# Patient Record
Sex: FEMALE | Race: WHITE | NOT HISPANIC OR LATINO | Employment: FULL TIME | URBAN - NONMETROPOLITAN AREA
[De-identification: names, ages, dates, MRNs, and addresses within clinical notes are randomized per-mention and may not be internally consistent; named-entity substitution may affect disease eponyms.]

---

## 2017-02-14 ENCOUNTER — OFFICE VISIT (OUTPATIENT)
Dept: RETAIL CLINIC | Facility: CLINIC | Age: 22
End: 2017-02-14

## 2017-02-14 VITALS
BODY MASS INDEX: 22.49 KG/M2 | RESPIRATION RATE: 16 BRPM | WEIGHT: 135 LBS | OXYGEN SATURATION: 99 % | TEMPERATURE: 98.1 F | HEART RATE: 88 BPM | HEIGHT: 65 IN

## 2017-02-14 DIAGNOSIS — J06.9 UPPER RESPIRATORY TRACT INFECTION, UNSPECIFIED TYPE: Primary | ICD-10-CM

## 2017-02-14 DIAGNOSIS — H66.90 EAR INFECTION: ICD-10-CM

## 2017-02-14 PROCEDURE — 99213 OFFICE O/P EST LOW 20 MIN: CPT | Performed by: NURSE PRACTITIONER

## 2017-02-14 RX ORDER — FLUTICASONE PROPIONATE 50 MCG
SPRAY, SUSPENSION (ML) NASAL
Qty: 1 BOTTLE | Refills: 0 | Status: SHIPPED | OUTPATIENT
Start: 2017-02-14 | End: 2017-07-16

## 2017-02-14 RX ORDER — AMOXICILLIN 400 MG/5ML
POWDER, FOR SUSPENSION ORAL
Qty: 225 ML | Refills: 0 | Status: SHIPPED | OUTPATIENT
Start: 2017-02-14 | End: 2017-07-16

## 2017-02-14 NOTE — PATIENT INSTRUCTIONS
"Upper Respiratory Infection, Adult  Most upper respiratory infections (URIs) are caused by a virus. A URI affects the nose, throat, and upper air passages. The most common type of URI is often called \"the common cold.\"  HOME CARE   · Take medicines only as told by your doctor.  · Gargle warm saltwater or take cough drops to comfort your throat as told by your doctor.  · Use a warm mist humidifier or inhale steam from a shower to increase air moisture. This may make it easier to breathe.  · Drink enough fluid to keep your pee (urine) clear or pale yellow.  · Eat soups and other clear broths.  · Have a healthy diet.  · Rest as needed.  · Go back to work when your fever is gone or your doctor says it is okay.  ¨ You may need to stay home longer to avoid giving your URI to others.  ¨ You can also wear a face mask and wash your hands often to prevent spread of the virus.  · Use your inhaler more if you have asthma.  · Do not use any tobacco products, including cigarettes, chewing tobacco, or electronic cigarettes. If you need help quitting, ask your doctor.  GET HELP IF:  · You are getting worse, not better.  · Your symptoms are not helped by medicine.  · You have chills.  · You are getting more short of breath.  · You have brown or red mucus.  · You have yellow or brown discharge from your nose.  · You have pain in your face, especially when you bend forward.  · You have a fever.  · You have puffy (swollen) neck glands.  · You have pain while swallowing.  · You have white areas in the back of your throat.  GET HELP RIGHT AWAY IF:   · You have very bad or constant:    Headache.    Ear pain.    Pain in your forehead, behind your eyes, and over your cheekbones (sinus pain).    Chest pain.  · You have long-lasting (chronic) lung disease and any of the following:    Wheezing.    Long-lasting cough.    Coughing up blood.    A change in your usual mucus.  · You have a stiff neck.  · You have changes in your:    Vision.   "  Hearing.    Thinking.    Mood.  MAKE SURE YOU:   · Understand these instructions.  · Will watch your condition.  · Will get help right away if you are not doing well or get worse.     This information is not intended to replace advice given to you by your health care provider. Make sure you discuss any questions you have with your health care provider.     Document Released: 06/05/2009 Document Revised: 05/03/2016 Document Reviewed: 03/25/2015  [employer] Patient Education ©2016 [employer]  Otitis Media, Adult  Otitis media is redness, soreness, and puffiness (swelling) in the space just behind your eardrum (middle ear). It may be caused by allergies or infection. It often happens along with a cold.  HOME CARE  · Take your medicine as told. Finish it even if you start to feel better.  · Only take over-the-counter or prescription medicines for pain, discomfort, or fever as told by your doctor.  · Follow up with your doctor as told.  GET HELP IF:  · You have otitis media only in one ear, or bleeding from your nose, or both.  · You notice a lump on your neck.  · You are not getting better in 3-5 days.  · You feel worse instead of better.  GET HELP RIGHT AWAY IF:   · You have pain that is not helped with medicine.  · You have puffiness, redness, or pain around your ear.  · You get a stiff neck.  · You cannot move part of your face (paralysis).  · You notice that the bone behind your ear hurts when you touch it.  MAKE SURE YOU:   · Understand these instructions.  · Will watch your condition.  · Will get help right away if you are not doing well or get worse.     This information is not intended to replace advice given to you by your health care provider. Make sure you discuss any questions you have with your health care provider.     Document Released: 06/05/2009 Document Revised: 01/08/2016 Document Reviewed: 07/15/2014  [employer] Patient Education ©2016 [employer]

## 2017-02-14 NOTE — PROGRESS NOTES
HPI Comments: Dedra presents to the clinic today c/o congestion which started four days ago. Associated symptoms include sore throat, ear pain, and headache. She has tried Ibuprofen  without adequate relief. Refer to ROS for additional information.    URI    The current episode started in the past 7 days. The problem has been gradually worsening. Associated symptoms include congestion, coughing (Nonproductive), ear pain, headaches, nausea (Has improved today), rhinorrhea and a sore throat. Pertinent negatives include no abdominal pain, rash, vomiting or wheezing. She has tried NSAIDs for the symptoms. The treatment provided mild relief.      The following portions of the patient's history were reviewed and updated as appropriate: allergies, current medications, past family history, past medical history, past social history, past surgical history and problem list.    Review of Systems   Constitutional: Positive for appetite change and fatigue. Fever: Tactile.   HENT: Positive for congestion, ear pain, postnasal drip, rhinorrhea and sore throat. Negative for sinus pressure.    Eyes: Negative for discharge and redness.   Respiratory: Positive for cough (Nonproductive). Negative for chest tightness, shortness of breath and wheezing.    Gastrointestinal: Positive for nausea (Has improved today). Negative for abdominal pain and vomiting.   Musculoskeletal: Negative for neck stiffness.   Skin: Negative for rash.   Neurological: Positive for headaches.   Hematological: Negative for adenopathy.   All other systems reviewed and are negative.    Physical Exam   Constitutional: She is oriented to person, place, and time. She appears well-developed and well-nourished. No distress.   HENT:   Head: Normocephalic.   Right Ear: Ear canal normal. There is tenderness. Tympanic membrane is erythematous and bulging. Tympanic membrane is not scarred and not retracted. A middle ear effusion is present.   Left Ear: Ear canal normal. There  is tenderness. Tympanic membrane is bulging. Tympanic membrane is not scarred, not erythematous and not retracted. A middle ear effusion is present.   Nose: Mucosal edema and rhinorrhea present. No epistaxis. Right sinus exhibits no maxillary sinus tenderness and no frontal sinus tenderness. Left sinus exhibits no maxillary sinus tenderness and no frontal sinus tenderness.   Mouth/Throat: Uvula is midline and mucous membranes are normal. No uvula swelling. Posterior oropharyngeal erythema present. No oropharyngeal exudate or posterior oropharyngeal edema.   Eyes: Pupils are equal, round, and reactive to light. Right eye exhibits no discharge. Left eye exhibits no discharge.   Neck: Neck supple. No rigidity.   Cardiovascular: Normal rate, regular rhythm and normal heart sounds.    Pulmonary/Chest: Effort normal. She has no decreased breath sounds. She has no wheezes. She has no rhonchi. She has no rales.   Abdominal: Soft. Bowel sounds are normal. There is no tenderness. There is no rigidity, no rebound, no guarding and no CVA tenderness.   Lymphadenopathy:     She has no cervical adenopathy.   Neurological: She is alert and oriented to person, place, and time.   Skin: Skin is warm and dry.   Psychiatric: She has a normal mood and affect. Her behavior is normal.   Vitals reviewed.    Assessment/Plan   Dedra was seen today for uri.    Diagnoses and all orders for this visit:    Upper respiratory tract infection, unspecified type  Comments:  Findings and recommendations discussed with Dedra. Supportive care measures. She declined a Strep Test today.   Orders:  -     fluticasone (FLONASE) 50 MCG/ACT nasal spray; Administer 1 spray both nostrils twice  daily    Ear infection  Comments:  Supportive care measures.  Orders:  -     amoxicillin (AMOXIL) 400 MG/5ML suspension; Take 11 ml by mouth twice a day for ten days      Findings and recommendations discussed with Dedra. Supportive care measures. Counseled regarding  impact of antibiotic on birth control.  Encouraged Dedra to seek further medical evaluation if symptoms worsen or do not improve within 48-72 hours.

## 2017-06-20 ENCOUNTER — OFFICE VISIT (OUTPATIENT)
Dept: FAMILY MEDICINE CLINIC | Facility: CLINIC | Age: 22
End: 2017-06-20

## 2017-06-20 VITALS
DIASTOLIC BLOOD PRESSURE: 92 MMHG | SYSTOLIC BLOOD PRESSURE: 129 MMHG | HEIGHT: 65 IN | HEART RATE: 82 BPM | OXYGEN SATURATION: 97 % | WEIGHT: 140.4 LBS | BODY MASS INDEX: 23.39 KG/M2

## 2017-06-20 DIAGNOSIS — J30.89 SEASONAL ALLERGIC RHINITIS DUE TO OTHER ALLERGIC TRIGGER: ICD-10-CM

## 2017-06-20 DIAGNOSIS — G43.509 PERSISTENT MIGRAINE AURA WITHOUT CEREBRAL INFARCTION AND WITHOUT STATUS MIGRAINOSUS, NOT INTRACTABLE: Primary | ICD-10-CM

## 2017-06-20 PROCEDURE — 99214 OFFICE O/P EST MOD 30 MIN: CPT | Performed by: FAMILY MEDICINE

## 2017-06-20 RX ORDER — SUMATRIPTAN 20 MG/1
1 SPRAY NASAL
Qty: 1 EACH | Refills: 1 | Status: SHIPPED | OUTPATIENT
Start: 2017-06-20 | End: 2019-06-25 | Stop reason: SDUPTHER

## 2017-06-20 RX ORDER — CETIRIZINE HYDROCHLORIDE, PSEUDOEPHEDRINE HYDROCHLORIDE 5; 120 MG/1; MG/1
1 TABLET, FILM COATED, EXTENDED RELEASE ORAL DAILY
Qty: 30 TABLET | Refills: 2 | Status: SHIPPED | OUTPATIENT
Start: 2017-06-20 | End: 2018-11-27

## 2017-06-20 RX ORDER — SUMATRIPTAN 100 MG/1
100 TABLET, FILM COATED ORAL ONCE AS NEEDED
Qty: 10 TABLET | Refills: 0 | Status: SHIPPED | OUTPATIENT
Start: 2017-06-20 | End: 2017-12-06

## 2017-07-17 NOTE — PROGRESS NOTES
"Dedra Diallo     VITALS: Blood pressure 129/92, pulse 82, height 65\" (165.1 cm), weight 140 lb 6.4 oz (63.7 kg), SpO2 97 %, not currently breastfeeding.    Subjective  Chief Complaint:   Chief Complaint   Patient presents with   • Headache        History of Present Illness:  Patient is a 22 y.o.  female who presents to clinic secondary to an acute concern.    Patient was seen today for these conditions and concerns:  Patient has been having more and more migraines than usual. She does have a history of migraines and usually takes NSAIDS. However, over the last week, she has been having daily headaches. She states that she has been taking NSAIDs and Goody powders several times a day. When the medication runs out, her headaches recur. She tried to take her sister's imitrex and that helped her the most. She knows when the migraines are coming on - she states that her vision greys out and she has occasionally numbness in her hands. The migraines happen on either side of her head and then goes off over. Occasional nausea and vomiting. No changes in vision. No changes in speech. This is not the worst headache in the world. States that more recently, she has been having sinus congestion and bilateral ear fullness. Denies any fevers, chills, rhinorrhea, or coughing. Does have a history of allergies - is currently not on any medications for it.    No complaints about any of the medications.    The following portions of the patient's history were reviewed and updated as appropriate: allergies, current medications, past family history, past medical history, past social history, past surgical history and problem list.    Past Medical History  Past Medical History:   Diagnosis Date   • Anxiety        Review of Systems  Constitutional: Denies any recent history of fevers, chills, itching.  Ears, Nose, Mouth, Throat: Denies any sore throat, rhinorrhea, or cough.  Cardiovascular: Denies any chest pain, pressure, or " palpitations.  Respiratory: Denies any shortness of breath or wheezing.  Gastrointestinal: Denies any abdominal pain, nausea, vomiting, diarrhea, or constipation.  Genitourinary: Denies any changes in urination.  Musculoskeletal: Denies any muscle weakness.  Skin and/or breasts: Denies any rashes.  Neurological: Denies any changes in balance or gait.  Psychiatric: Denies any suicidal or homicidal ideations.  Endocrine: Denies any heat or cold intolerance. Denies any voice changes, polydipsia, or polyuria.  Hematologic/Lymphatic: Denies any anemia or easy bruising.    Surgical History  Past Surgical History:   Procedure Laterality Date   • ADENOIDECTOMY     • TONSILLECTOMY         Family History  Family History   Problem Relation Age of Onset   • No Known Problems Mother    • No Known Problems Father        Social History  Social History     Social History   • Marital status: Single     Spouse name: N/A   • Number of children: N/A   • Years of education: N/A     Occupational History   • Not on file.     Social History Main Topics   • Smoking status: Never Smoker   • Smokeless tobacco: Never Used   • Alcohol use No   • Drug use: No   • Sexual activity: Defer     Other Topics Concern   • Not on file     Social History Narrative       Objective  Physical Exam  Gen: Patient in NAD. Pleasant and answers appropriately. A&Ox3.    Skin: Warm and dry with normal turgor. No purpura, rashes, or unusual pigmentation noted. Hair is normal in appearance and distribution.    HEENT: NC/AT. No lesions noted. Conjunctiva clear, sclera nonicteric. PERRL. EOMI without nystagmus or strabismus. Fundi appear benign. No hemorrhages or exudates of eyes. Auditory canals are patent bilaterally without lesions. TMs intact,  nonerythematous, nonbulging without lesions. Nasal mucosa pink, nonerythematous, and nonedematous. Frontal and maxillary sinuses are nontender. O/P nonerythematous and moist without exudate.    Neck: Supple without lymph  nodes palpated. FROM.     Lungs: CTA B/L without rales, rhonchi, crackles, or wheezes.    Heart: RRR. S1 and S2 normal. No S3 or S4. No MRGT.    Abd: Soft, nontender,nondistended. (+)BSx4 quadrants.     Extrem: No CCE. Radial pulses 2+/4 and equal B/L. FROMx4. No bone, joint, or muscle tenderness noted.    Neuro: No focal motor/sensory deficits.    Procedures    Assessment/Plan  Dedra Diallo is a 22 y.o. here for an acute concern.    Migraines  Most likely now she is having rebound headaches secondary to the NSAIDs and the caffeine. Discussed with patient to stop those medications. Discussed with patient to increase fluids. Will start patient on sumatriptan; script written.    Allergic rhinitis  Patient to start zyrtec-D daily. This may also be playing a role in her migraines.    Findings and plans discussed with patient who verbalizes understanding and agreement. Will followup with patient once results are in. Patient to followup at clinic PRN or in three months for further medical followup.    Rufina Smith MD

## 2017-10-17 RX ORDER — NORETHINDRONE ACETATE AND ETHINYL ESTRADIOL .03; 1.5 MG/1; MG/1
1 TABLET ORAL DAILY
Qty: 28 EACH | Refills: 11 | Status: SHIPPED | OUTPATIENT
Start: 2017-10-17 | End: 2017-11-16 | Stop reason: SDUPTHER

## 2017-11-16 ENCOUNTER — OFFICE VISIT (OUTPATIENT)
Dept: FAMILY MEDICINE CLINIC | Facility: CLINIC | Age: 22
End: 2017-11-16

## 2017-11-16 ENCOUNTER — TELEPHONE (OUTPATIENT)
Dept: FAMILY MEDICINE CLINIC | Facility: CLINIC | Age: 22
End: 2017-11-16

## 2017-11-16 VITALS
BODY MASS INDEX: 24.32 KG/M2 | SYSTOLIC BLOOD PRESSURE: 122 MMHG | DIASTOLIC BLOOD PRESSURE: 86 MMHG | HEART RATE: 89 BPM | WEIGHT: 146 LBS | OXYGEN SATURATION: 99 % | HEIGHT: 65 IN

## 2017-11-16 DIAGNOSIS — F41.8 DEPRESSION WITH ANXIETY: ICD-10-CM

## 2017-11-16 DIAGNOSIS — Z30.8 ENCOUNTER FOR OTHER CONTRACEPTIVE MANAGEMENT: ICD-10-CM

## 2017-11-16 DIAGNOSIS — G43.709 CHRONIC MIGRAINE WITHOUT AURA WITHOUT STATUS MIGRAINOSUS, NOT INTRACTABLE: ICD-10-CM

## 2017-11-16 DIAGNOSIS — R59.0 LYMPHADENOPATHY OF LEFT CERVICAL REGION: Primary | ICD-10-CM

## 2017-11-16 LAB
BASOPHILS # BLD AUTO: 0.03 10*3/MM3 (ref 0–0.3)
BASOPHILS NFR BLD AUTO: 0.5 % (ref 0–2)
DEPRECATED RDW RBC AUTO: 38.2 FL (ref 37–54)
EOSINOPHIL # BLD AUTO: 0.07 10*3/MM3 (ref 0–0.7)
EOSINOPHIL NFR BLD AUTO: 1.2 % (ref 0–5)
ERYTHROCYTE [DISTWIDTH] IN BLOOD BY AUTOMATED COUNT: 12.4 % (ref 11.5–14.5)
HCT VFR BLD AUTO: 42.3 % (ref 37–47)
HGB BLD-MCNC: 14.1 G/DL (ref 12–16)
IMM GRANULOCYTES # BLD: 0 10*3/MM3 (ref 0–0.03)
IMM GRANULOCYTES NFR BLD: 0 % (ref 0–0.5)
LYMPHOCYTES # BLD AUTO: 1.81 10*3/MM3 (ref 1–3)
LYMPHOCYTES NFR BLD AUTO: 30.6 % (ref 21–51)
MCH RBC QN AUTO: 28.7 PG (ref 27–33)
MCHC RBC AUTO-ENTMCNC: 33.3 G/DL (ref 33–37)
MCV RBC AUTO: 86.2 FL (ref 80–94)
MONOCYTES # BLD AUTO: 0.36 10*3/MM3 (ref 0.1–0.9)
MONOCYTES NFR BLD AUTO: 6.1 % (ref 0–10)
NEUTROPHILS # BLD AUTO: 3.64 10*3/MM3 (ref 1.4–6.5)
NEUTROPHILS NFR BLD AUTO: 61.6 % (ref 30–70)
PLATELET # BLD AUTO: 332 10*3/MM3 (ref 130–400)
PMV BLD AUTO: 10.1 FL (ref 6–10)
RBC # BLD AUTO: 4.91 10*6/MM3 (ref 4.2–5.4)
WBC NRBC COR # BLD: 5.91 10*3/MM3 (ref 4.5–12.5)

## 2017-11-16 PROCEDURE — 99214 OFFICE O/P EST MOD 30 MIN: CPT | Performed by: FAMILY MEDICINE

## 2017-11-16 PROCEDURE — 85025 COMPLETE CBC W/AUTO DIFF WBC: CPT | Performed by: FAMILY MEDICINE

## 2017-11-16 RX ORDER — NORETHINDRONE ACETATE AND ETHINYL ESTRADIOL .03; 1.5 MG/1; MG/1
1 TABLET ORAL DAILY
Qty: 28 EACH | Refills: 11 | Status: SHIPPED | OUTPATIENT
Start: 2017-11-16 | End: 2018-08-07 | Stop reason: SDUPTHER

## 2017-11-16 NOTE — TELEPHONE ENCOUNTER
----- Message from Rufina Smith MD sent at 11/16/2017  1:01 PM EST -----  Please call and let her know that blood count ok. We will wait and see what the lymph node U/S shows.        Patient notified & verbalized understanding.

## 2017-11-17 ENCOUNTER — APPOINTMENT (OUTPATIENT)
Dept: ULTRASOUND IMAGING | Facility: HOSPITAL | Age: 22
End: 2017-11-17

## 2017-11-17 ENCOUNTER — TELEPHONE (OUTPATIENT)
Dept: FAMILY MEDICINE CLINIC | Facility: CLINIC | Age: 22
End: 2017-11-17

## 2017-11-17 RX ORDER — CLINDAMYCIN HYDROCHLORIDE 300 MG/1
300 CAPSULE ORAL 3 TIMES DAILY
Qty: 21 CAPSULE | Refills: 0 | Status: SHIPPED | OUTPATIENT
Start: 2017-11-17 | End: 2019-06-25

## 2017-11-17 NOTE — TELEPHONE ENCOUNTER
Yes. So here's the deal. I sent in clindamycin into the pharmacy. If the nodes remain, please stress to Mom that it is essential that she go get it done. The CBC is clean, but sometimes, if there is a cancer like lymphoma, the nodes come first and the CBC does not change until very late. The U/S would  the node changes and would be a faster and better diagnosis. Thanks.

## 2017-11-17 NOTE — TELEPHONE ENCOUNTER
Mother called reports she cannot go for the U/S you ordered because it is $700.00 out of pocket,is questioning if you will just give her an antibiotic for the knots she has in her neck that is what has always helped in the past?

## 2017-11-17 NOTE — TELEPHONE ENCOUNTER
Yes. So here's the deal. I sent in clindamycin into the pharmacy. If the nodes remain, please stress to Mom that it is essential that she go get it done. The CBC is clean, but sometimes, if there is a cancer like lymphoma, the nodes come first and the CBC does not change until very late. The U/S would  the node changes and would be a faster and better diagnosis. Thanks.       Left a message to return call.      Mother returned call & verbalized understanding.

## 2017-11-29 ENCOUNTER — TELEPHONE (OUTPATIENT)
Dept: FAMILY MEDICINE CLINIC | Facility: CLINIC | Age: 22
End: 2017-11-29

## 2017-11-29 NOTE — TELEPHONE ENCOUNTER
Spoke with patient regarding her U/S results,she hasn't finished her antibiotics yet,can still feel the areas,no new symptoms.

## 2017-12-06 NOTE — PROGRESS NOTES
"Dedra Diallo     VITALS: Blood pressure 122/86, pulse 89, height 165.1 cm (65\"), weight 66.2 kg (146 lb), SpO2 99 %, not currently breastfeeding.    Subjective  Chief Complaint:   Chief Complaint   Patient presents with   • Cyst     behind left ear and left side of neck         History of Present Illness:  Patient is a 22 y.o.  female who presents to clinic secondary to medical followup.    Patient was seen today for these conditions and concerns:  1) Patient states that she is able to feel lymph nodes over the left side of her neck. Denies any fevers, chills, ear pain, sinus congestion, rhinorrhea, coughing, shortness of breath, or chest pain. Has not been sick recently.     Patient has a history of migraines and is currently controlled on imitrex nasal spray. She utilizes the nasal spray several times a month and the migraines stop. Denies any side effects of the medications. Has declined prophylactic medications in the past.     Patient has a history of depression with anxiety and is currently on zoloft 50 mg orally daily. Patient states that this medication is working. Denies any side effects of the medication. Patient states that she is sleeping well and can get out of bed daily to accomplish daily activities. Patient has no anhedonia. Mood is stable. Has no feelings of guilt. Has good concentration and memory ability. Has energy. Is able to make goals. Is not crying a lot. Appetite is fair. Denies any suicidal or homicidal ideations. Does not have any auditory or visual hallucinations.    No complaints about any of the medications.    The following portions of the patient's history were reviewed and updated as appropriate: allergies, current medications, past family history, past medical history, past social history, past surgical history and problem list.    Past Medical History  Past Medical History:   Diagnosis Date   • Anxiety        Review of Systems  Constitutional: Denies any recent history of HAs, " dizziness, fevers, chills, itching.  Eyes: Denies any changes in vision. Denies any blurry vision or diplopia.  Ears, Nose, Mouth, Throat: Denies any sore throat, rhinorrhea, or cough.  Cardiovascular: Denies any chest pain, pressure, or palpitations.  Respiratory: Denies any shortness of breath or wheezing.  Gastrointestinal: Denies any abdominal pain, nausea, vomiting, diarrhea, or constipation.  Genitourinary: Denies any changes in urination.  Musculoskeletal: Denies any muscle weakness.  Skin and/or breasts: Denies any rashes.  Neurological: Denies any changes in balance or gait.  Psychiatric: Denies any anxiety, depression, or insomnia. Denies any suicidal or homicidal ideations.    Surgical History  Past Surgical History:   Procedure Laterality Date   • ADENOIDECTOMY     • TONSILLECTOMY         Family History  Family History   Problem Relation Age of Onset   • No Known Problems Mother    • No Known Problems Father        Social History  Social History     Social History   • Marital status: Single     Spouse name: N/A   • Number of children: N/A   • Years of education: N/A     Occupational History   • Not on file.     Social History Main Topics   • Smoking status: Never Smoker   • Smokeless tobacco: Never Used   • Alcohol use No   • Drug use: No   • Sexual activity: Defer     Other Topics Concern   • Not on file     Social History Narrative       Objective  Physical Exam  Gen: Patient in NAD. Pleasant and answers appropriately. A&Ox3.    Skin: Warm and dry with normal turgor. No purpura, rashes, or unusual pigmentation noted. Hair is normal in appearance and distribution.    HEENT: NC/AT. No lesions noted. Conjunctiva clear, sclera nonicteric. PERRL. EOMI without nystagmus or strabismus. Fundi appear benign. No hemorrhages or exudates of eyes. Auditory canals are patent bilaterally without lesions. TMs intact,  nonerythematous, nonbulging without lesions. Nasal mucosa pink, nonerythematous, and nonedematous.  Frontal and maxillary sinuses are nontender. O/P nonerythematous and moist without exudate.    Neck: Supple with left cervical lymph nodes palpated. FROM.     Lungs: CTA B/L without rales, rhonchi, crackles, or wheezes.    Heart: RRR. S1 and S2 normal. No S3 or S4. No MRGT.    Abd: Soft, nontender,nondistended. (+)BSx4 quadrants.     Extrem: No CCE. Radial pulses 2+/4 and equal B/L. FROMx4. No bone, joint, or muscle tenderness noted.    Neuro: No focal motor/sensory deficits.    Procedures    Assessment/Plan  Dedra Diallo is a 22 y.o. here for medical followup.  Diagnoses and all orders for this visit:    Lymphadenopathy of left cervical region  -     CBC & Differential  -     US Soft Tissue  -     CBC Auto Differential  Will start patient on clindamycin 300 mg orally TID x 7 days. Will check CBC. Cervical soft tissue U/S ordered to determine lymph node size. Continue to closely monitor. Recheck in a month.    Undesired fertility  -     Norethindrone Acet-Ethinyl Est (JUNEL 1.5/30) 1.5-30 MG-MCG tablet; Take 1 tablet by mouth Daily.  Refilled BCPs. Patient has no side effects.    Depression with Anxiety  Continue zoloft 50 mg orally daily.    Migraines  Continue imitrex nasal spray as needed.    Preventative Medicine  Recommended pap.    Findings and plans discussed with patient who verbalizes understanding and agreement. Will followup with patient once results are in. Patient to followup at clinic PRN or in one month for further medical followup.    Rufina Smith MD

## 2018-06-11 ENCOUNTER — TELEPHONE (OUTPATIENT)
Dept: FAMILY MEDICINE CLINIC | Facility: CLINIC | Age: 23
End: 2018-06-11

## 2018-06-11 NOTE — TELEPHONE ENCOUNTER
Let's schedule her for an appointment. How bad is the bleeding? If it's not bad, we can do a pap. If it is, just schedule because I'd like to discuss with her her options first. Thanks.

## 2018-06-11 NOTE — TELEPHONE ENCOUNTER
Is she doing anything different? Increased stress, different eating habits, changes in weight, thyroid maybe? Is there are history of thyroid problems in the family? When was the last time she had a pap? We may have to do one. Otherwise, I will most likely increase her BCPs to 1/35 instead of 1/30, but I just hate to give such a young girl such an accelerated dose. Thanks.       Spoke with her,nothing is different,no history of thyroid problems in the family,she would like to get hers checked has never been checked,has never had a pap.

## 2018-06-11 NOTE — TELEPHONE ENCOUNTER
Let's schedule her for an appointment. How bad is the bleeding? If it's not bad, we can do a pap. If it is, just schedule because I'd like to discuss with her her options first. Thanks.     Spoke with patient,she is only spotting,scheduled for next week.

## 2018-06-11 NOTE — TELEPHONE ENCOUNTER
Is she doing anything different? Increased stress, different eating habits, changes in weight, thyroid maybe? Is there are history of thyroid problems in the family? When was the last time she had a pap? We may have to do one. Otherwise, I will most likely increase her BCPs to 1/35 instead of 1/30, but I just hate to give such a young girl such an accelerated dose. Thanks.

## 2018-06-11 NOTE — TELEPHONE ENCOUNTER
Patient called reports her birth control pills are not working any longer,reports she was on her period for 7 days,stopped for 2 days then started right back,is wondering if she could get a stronger med?

## 2018-08-07 ENCOUNTER — OFFICE VISIT (OUTPATIENT)
Dept: FAMILY MEDICINE CLINIC | Facility: CLINIC | Age: 23
End: 2018-08-07

## 2018-08-07 VITALS
HEART RATE: 74 BPM | OXYGEN SATURATION: 99 % | HEIGHT: 65 IN | WEIGHT: 159 LBS | SYSTOLIC BLOOD PRESSURE: 119 MMHG | BODY MASS INDEX: 26.49 KG/M2 | DIASTOLIC BLOOD PRESSURE: 78 MMHG

## 2018-08-07 DIAGNOSIS — Z86.69 HX OF MIGRAINES: ICD-10-CM

## 2018-08-07 DIAGNOSIS — Z30.09 BIRTH CONTROL COUNSELING: ICD-10-CM

## 2018-08-07 DIAGNOSIS — F41.0 PANIC DISORDER: Primary | ICD-10-CM

## 2018-08-07 PROCEDURE — 99213 OFFICE O/P EST LOW 20 MIN: CPT | Performed by: NURSE PRACTITIONER

## 2018-08-07 RX ORDER — NORETHINDRONE ACETATE AND ETHINYL ESTRADIOL .03; 1.5 MG/1; MG/1
1 TABLET ORAL DAILY
Qty: 28 EACH | Refills: 11 | Status: SHIPPED | OUTPATIENT
Start: 2018-08-07 | End: 2019-06-25 | Stop reason: SDUPTHER

## 2018-08-07 NOTE — PROGRESS NOTES
"Subjective   Dedra Diallo is a 23 y.o. female.   Chief Compliant: The patient presents with Anxiety    Anxiety   Presents for follow-up visit. Symptoms include excessive worry, nervous/anxious behavior and panic. Patient reports no chest pain or palpitations. Primary symptoms comment: Feels her symptoms are controlled. Symptoms occur rarely. The severity of symptoms is mild. The quality of sleep is good. Nighttime awakenings: occasional.     Treatment side effects: denies any known side effects.   Migraine    This is a chronic (history of migraine with symptoms controlled with PRN use of imitrex) problem. Pertinent negatives include no numbness, seizures or weakness.      The following portions of the patient's history were reviewed and updated as appropriate: allergies, current medications, past family history, past medical history, past social history, past surgical history and problem list.      Review of Systems   Constitutional: Negative.    Respiratory: Negative.    Cardiovascular: Negative for chest pain, palpitations and leg swelling.   Gastrointestinal: Negative.    Genitourinary: Negative.    Musculoskeletal: Negative.    Skin: Negative.    Neurological: Positive for headaches (hx). Negative for tremors, seizures, syncope, speech difficulty, weakness, light-headedness and numbness.   Psychiatric/Behavioral: The patient is nervous/anxious.    All other systems reviewed and are negative.      Procedures    Vitals: Blood pressure 119/78, pulse 74, height 165.1 cm (65\"), weight 72.1 kg (159 lb), SpO2 99 %, not currently breastfeeding.     Allergies:   Allergies   Allergen Reactions   • No Known Drug Allergy           Objective   Physical Exam   Constitutional: She is oriented to person, place, and time. She appears well-developed and well-nourished. No distress.   HENT:   Head: Normocephalic.   Right Ear: External ear normal.   Left Ear: External ear normal.   Nose: Nose normal.   Mouth/Throat: Oropharynx is " clear and moist. No oropharyngeal exudate.   Eyes: Pupils are equal, round, and reactive to light. Conjunctivae and EOM are normal. Right eye exhibits no discharge. Left eye exhibits no discharge.   Neck: Neck supple.   Cardiovascular: Normal rate, regular rhythm and normal heart sounds.    No murmur heard.  Pulmonary/Chest: Effort normal and breath sounds normal.   Musculoskeletal: Normal range of motion.   Neurological: She is alert and oriented to person, place, and time. She displays normal reflexes. No cranial nerve deficit or sensory deficit. She exhibits normal muscle tone. Coordination normal.   Skin: Skin is warm and dry. She is not diaphoretic.   Psychiatric: She has a normal mood and affect. Her behavior is normal.   Nursing note and vitals reviewed.      During this visit the following were done:  Labs Reviewed []    Labs Ordered []    Radiology Reports Reviewed []    Radiology Ordered []    PCP Records Reviewed []    Referring Provider Records Reviewed []    ER Records Reviewed []    Hospital Records Reviewed []    History Obtained From Family []    Radiology Images Reviewed []    Other Reviewed []    Records Requested []      Assessment/Plan   Discussed with patient impression and plan, patient verbalizes understanding  Continue with routine medication   Will RTC for lucía Colmenares was seen today for anxiety.    Diagnoses and all orders for this visit:    Panic disorder    Hx of migraines    Birth control counseling    Other orders  -     Norethindrone Acet-Ethinyl Est (JUNEL 1.5/30) 1.5-30 MG-MCG tablet; Take 1 tablet by mouth Daily.  -     sertraline (ZOLOFT) 50 MG tablet; Take 1 tablet by mouth Daily.

## 2018-10-05 ENCOUNTER — OFFICE VISIT (OUTPATIENT)
Dept: FAMILY MEDICINE CLINIC | Facility: CLINIC | Age: 23
End: 2018-10-05

## 2018-10-05 VITALS
SYSTOLIC BLOOD PRESSURE: 116 MMHG | HEART RATE: 80 BPM | BODY MASS INDEX: 26.33 KG/M2 | HEIGHT: 65 IN | WEIGHT: 158 LBS | DIASTOLIC BLOOD PRESSURE: 83 MMHG

## 2018-10-05 DIAGNOSIS — R63.5 WEIGHT GAIN: ICD-10-CM

## 2018-10-05 DIAGNOSIS — Z01.419 WELL WOMAN EXAM: Primary | ICD-10-CM

## 2018-10-05 LAB
BASOPHILS # BLD AUTO: 0.03 10*3/MM3 (ref 0–0.3)
BASOPHILS NFR BLD AUTO: 0.4 % (ref 0–2)
DEPRECATED RDW RBC AUTO: 37.6 FL (ref 37–54)
EOSINOPHIL # BLD AUTO: 0.07 10*3/MM3 (ref 0–0.7)
EOSINOPHIL NFR BLD AUTO: 1 % (ref 0–5)
ERYTHROCYTE [DISTWIDTH] IN BLOOD BY AUTOMATED COUNT: 12.3 % (ref 11.5–14.5)
HCT VFR BLD AUTO: 42.6 % (ref 37–47)
HGB BLD-MCNC: 14.1 G/DL (ref 12–16)
IMM GRANULOCYTES # BLD: 0 10*3/MM3 (ref 0–0.03)
IMM GRANULOCYTES NFR BLD: 0 % (ref 0–0.5)
LYMPHOCYTES # BLD AUTO: 2.47 10*3/MM3 (ref 1–3)
LYMPHOCYTES NFR BLD AUTO: 35.3 % (ref 21–51)
MCH RBC QN AUTO: 28.3 PG (ref 27–33)
MCHC RBC AUTO-ENTMCNC: 33.1 G/DL (ref 33–37)
MCV RBC AUTO: 85.5 FL (ref 80–94)
MONOCYTES # BLD AUTO: 0.5 10*3/MM3 (ref 0.1–0.9)
MONOCYTES NFR BLD AUTO: 7.1 % (ref 0–10)
NEUTROPHILS # BLD AUTO: 3.93 10*3/MM3 (ref 1.4–6.5)
NEUTROPHILS NFR BLD AUTO: 56.2 % (ref 30–70)
PLATELET # BLD AUTO: 326 10*3/MM3 (ref 130–400)
PMV BLD AUTO: 9.9 FL (ref 6–10)
RBC # BLD AUTO: 4.98 10*6/MM3 (ref 4.2–5.4)
TSH SERPL DL<=0.05 MIU/L-ACNC: 1.1 MIU/ML (ref 0.55–4.78)
WBC NRBC COR # BLD: 7 10*3/MM3 (ref 4.5–12.5)

## 2018-10-05 PROCEDURE — 36415 COLL VENOUS BLD VENIPUNCTURE: CPT | Performed by: FAMILY MEDICINE

## 2018-10-05 PROCEDURE — 84443 ASSAY THYROID STIM HORMONE: CPT | Performed by: FAMILY MEDICINE

## 2018-10-05 PROCEDURE — 99395 PREV VISIT EST AGE 18-39: CPT | Performed by: FAMILY MEDICINE

## 2018-10-05 PROCEDURE — 85025 COMPLETE CBC W/AUTO DIFF WBC: CPT | Performed by: FAMILY MEDICINE

## 2018-10-09 LAB
CONV .: NORMAL
CYTOLOGIST CVX/VAG CYTO: NORMAL
CYTOLOGY CVX/VAG DOC THIN PREP: NORMAL
DX ICD CODE: NORMAL
HIV 1 & 2 AB SER-IMP: NORMAL
OTHER STN SPEC: NORMAL
PATH REPORT.FINAL DX SPEC: NORMAL
STAT OF ADQ CVX/VAG CYTO-IMP: NORMAL

## 2018-10-11 LAB
A VAGINAE DNA VAG QL NAA+PROBE: NORMAL SCORE
BVAB2 DNA VAG QL NAA+PROBE: NORMAL SCORE
C ALBICANS DNA VAG QL NAA+PROBE: NEGATIVE
C GLABRATA DNA VAG QL NAA+PROBE: NEGATIVE
C TRACH RRNA SPEC QL NAA+PROBE: NEGATIVE
MEGA1 DNA VAG QL NAA+PROBE: NORMAL SCORE
N GONORRHOEA RRNA SPEC QL NAA+PROBE: NEGATIVE
T VAGINALIS RRNA SPEC QL NAA+PROBE: NEGATIVE

## 2018-10-16 ENCOUNTER — TELEPHONE (OUTPATIENT)
Dept: FAMILY MEDICINE CLINIC | Facility: CLINIC | Age: 23
End: 2018-10-16

## 2018-10-17 NOTE — TELEPHONE ENCOUNTER
Please let her know that both the pap and the vaginal swabs were normal. She was complaining of a lot of discharge and smells. Please let her know that while the swabs are normal, which indicates that she does not have an infection at all, this may then mean that she is utilizing too many outside substances that is irritating her private areas. Please let her know to let up on this - no hot tubbing, bubble baths, douching, scrubbing. What soap is she using. Either Dove or the really mild Vagisil wash is okay - don't overdo it - it is counterintuitive, but she needs to let up on that and it will go back to normal. If she still has symptoms in several weeks, tell her to call. Thanks.       Spoke with patient & she verbalized understanding.

## 2018-10-17 NOTE — TELEPHONE ENCOUNTER
Please let her know that both the pap and the vaginal swabs were normal. She was complaining of a lot of discharge and smells. Please let her know that while the swabs are normal, which indicates that she does not have an infection at all, this may then mean that she is utilizing too many outside substances that is irritating her private areas. Please let her know to let up on this - no hot tubbing, bubble baths, douching, scrubbing. What soap is she using. Either Dove or the really mild Vagisil wash is okay - don't overdo it - it is counterintuitive, but she needs to let up on that and it will go back to normal. If she still has symptoms in several weeks, tell her to call. Thanks.

## 2018-10-23 NOTE — PROGRESS NOTES
"Dedra Diallo     VITALS: Blood pressure 116/83, pulse 80, height 165.1 cm (65\"), weight 71.7 kg (158 lb), not currently breastfeeding.    Subjective  Chief Complaint:   Chief Complaint   Patient presents with   • Gynecologic Exam        History of Present Illness:  Patient is a 23 year old  female with a medical history significant for panic disorder who presents to clinic for her well woman's exam.  Patient has no acute concerns. Patient denies any current symptoms.  She denies any vaginal discharge, pain, rash, itching, bleeding, or changes in odor. Patient denies any breast concerns.    Patient's last pelvic exam/pap smear was never. She has never had an abnormal exam.  She has never had any STDs. She is currently sexually active.  She feels safe in her relationship. Patient is on birth control pills. Patient's LMP was several days ago, and she gets her period monthly.  Patient states that her periods last approximately 5-7 days with clotting and cramping.  Patient first had her menses at age 14.  Patient has not gone through menopause yet. Mother had to have a hysterectomy secondary to menorrhagia. Patient is .  Patient does occasional breast exams;  she has not noticed any breast chances. No mammogram history. Positive ovarian cancer in grandmother. Positive family history of endometriosis. There is no other family history of breast, uterine, or cervical cancers.    No complaints about any of the medications.    The following portions of the patient's history were reviewed and updated as appropriate: allergies, current medications, past family history, past medical history, past social history, past surgical history and problem list.    Past Medical History  Past Medical History:   Diagnosis Date   • Anxiety        Review of Systems  Constitutional: Denies any recent history of HAs, dizziness, fevers, chills, itching.  Eyes: Denies any changes in vision. Denies any blurry vision or " diplopia.  Ears, Nose, Mouth, Throat: Denies any sore throat, rhinorrhea, or cough.  Cardiovascular: Denies any chest pain, pressure, or palpitations.  Respiratory: Denies any shortness of breath or wheezing.  Gastrointestinal: Denies any abdominal pain, nausea, vomiting, diarrhea, or constipation.  Genitourinary: Denies any changes in urination.  Musculoskeletal: Denies any muscle weakness.  Skin and/or breasts: Denies any rashes.  Neurological: Denies any changes in balance or gait.  Psychiatric: Denies any anxiety, depression, or insomnia. Denies any suicidal or homicidal ideations.  Endocrine: Denies any heat or cold intolerance. Denies any voice changes, polydipsia, or polyuria.  Hematologic/Lymphatic: Denies any anemia or easy bruising.    Surgical History  Past Surgical History:   Procedure Laterality Date   • ADENOIDECTOMY     • TONSILLECTOMY         Family History  Family History   Problem Relation Age of Onset   • No Known Problems Mother    • No Known Problems Father        Social History  Social History     Social History   • Marital status: Single     Spouse name: N/A   • Number of children: N/A   • Years of education: N/A     Occupational History   • Not on file.     Social History Main Topics   • Smoking status: Never Smoker   • Smokeless tobacco: Never Used   • Alcohol use No   • Drug use: No   • Sexual activity: Defer     Other Topics Concern   • Not on file     Social History Narrative   • No narrative on file       Objective  Physical Exam  Gen: Patient in NAD. Pleasant and answers appropriately. A&Ox3.    Skin: Warm and dry with normal turgor. No purpura, rashes, or unusual pigmentation noted. Hair is normal in appearance and distribution.    HEENT: NC/AT. No lesions noted. Conjunctiva clear, sclera nonicteric. PERRL. EOMI without nystagmus or strabismus. Fundi appear benign. No hemorrhages or exudates of eyes. Auditory canals are patent bilaterally without lesions. TMs intact,  nonerythematous,  nonbulging without lesions. Nasal mucosa pink, nonerythematous, and nonedematous. Frontal and maxillary sinuses are nontender. O/P nonerythematous and moist without exudate.    Neck: Supple without lymph nodes palpated. FROM.     Lungs: CTA B/L without rales, rhonchi, crackles, or wheezes.    Heart: RRR. S1 and S2 normal. No S3 or S4. No MRGT.    Breasts: Bilaterally symmetric without tenderness, palpable masses, skin dimpling, or adenopathy.  No nipple retraction or discharge.    Abd: Soft, nontender,nondistended. (+)BSx4 quadrants.     Extrem: No CCE. Radial pulses 2+/4 and equal B/L. FROMx4. No bone, joint, or muscle tenderness noted.    Neuro: No focal motor/sensory deficits.    Pelvic Exam/Pap Smear done and pending:  Vulva: Nonerythematous. No lesions or ulcerations. Hair is normal distribution.  Vagina: Nonerythematous. Mucosa pink with rugae. No lesions noted. Normal discharge.  Cervix: Nulliparous. Midline. Closed Os. Nonerythematous, nonedematous. No CMT on bimanual exam.  Uterus: Normal in size. No masses noted.  Adnexa: No masses palpated. No point of tenderness.    Procedures    Assessment/Plan  Dedra Diallo is a 23 y.o. here for her well woman's exam.  Diagnoses and all orders for this visit:    Well woman exam  -     NuSwab VG+ - Swab, Vagina  -     Pap IG, Rfx HPV All Pth  Pelvic exam normal. Pap smear done and pending. NuSwab pending. Discussed findings on pelvic exam extensively with patient, and told her that we would wait for the results.  Preventative guidelines covered include vaccinations, seat belt use, domestic violence, and alcohol usage.    Weight gain  -     CBC & Differential  -     TSH  -     CBC Auto Differential  Will check labs. May be secondary to SSRI. Patient is thinking about switching her SSRI. Discussed risks, side effects, and benefits of switching SSRIs. Patient is going to think about her options.     Patient's Body mass index is 26.29 kg/m². BMI is within normal  parameters. No follow-up required.     Findings and plans discussed with patient who verbalizes understanding and agreement. Will followup with patient once results are in. Patient to followup at clinic PRN or in three months for further medical followup.    Rufina Smith MD

## 2018-11-09 ENCOUNTER — OFFICE VISIT (OUTPATIENT)
Dept: FAMILY MEDICINE CLINIC | Facility: CLINIC | Age: 23
End: 2018-11-09

## 2018-11-09 VITALS
SYSTOLIC BLOOD PRESSURE: 133 MMHG | HEART RATE: 84 BPM | HEIGHT: 65 IN | DIASTOLIC BLOOD PRESSURE: 85 MMHG | WEIGHT: 162 LBS | BODY MASS INDEX: 26.99 KG/M2 | TEMPERATURE: 98.3 F

## 2018-11-09 DIAGNOSIS — G43.709 CHRONIC MIGRAINE WITHOUT AURA WITHOUT STATUS MIGRAINOSUS, NOT INTRACTABLE: ICD-10-CM

## 2018-11-09 DIAGNOSIS — Z23 IMMUNIZATION DUE: ICD-10-CM

## 2018-11-09 DIAGNOSIS — F41.0 PANIC DISORDER: ICD-10-CM

## 2018-11-09 DIAGNOSIS — F33.0 MILD EPISODE OF RECURRENT MAJOR DEPRESSIVE DISORDER (HCC): Primary | ICD-10-CM

## 2018-11-09 PROCEDURE — 99214 OFFICE O/P EST MOD 30 MIN: CPT | Performed by: FAMILY MEDICINE

## 2018-11-09 PROCEDURE — 90632 HEPA VACCINE ADULT IM: CPT | Performed by: FAMILY MEDICINE

## 2018-11-09 PROCEDURE — 90471 IMMUNIZATION ADMIN: CPT | Performed by: FAMILY MEDICINE

## 2018-11-09 RX ORDER — BUSPIRONE HYDROCHLORIDE 10 MG/1
10 TABLET ORAL 3 TIMES DAILY PRN
Qty: 90 TABLET | Refills: 2 | Status: SHIPPED | OUTPATIENT
Start: 2018-11-09 | End: 2019-06-25 | Stop reason: SDUPTHER

## 2018-11-27 NOTE — PROGRESS NOTES
"Dedra Diallo     VITALS: Blood pressure 133/85, pulse 84, temperature 98.3 °F (36.8 °C), temperature source Oral, height 165.1 cm (65\"), weight 73.5 kg (162 lb), not currently breastfeeding.    Subjective  Chief Complaint:   Chief Complaint   Patient presents with   • Depression        History of Present Illness:  Patient is a 23 y.o.  female who presents to clinic secondary to medical followup.    Patient has a history of migraines and is currently controlled on imitrex nasal spray. She utilizes the nasal spray several times a month and the migraines stop. Denies any side effects of the medications. Has declined prophylactic medications in the past.      Patient has a history of depression with anxiety and is currently on zoloft 50 mg orally daily. Patient states that this medication is not working. Denies any side effects of the medication. Patient states that she is sleeping well and can get out of bed daily to accomplish daily activities. Patient has no anhedonia. Mood is stable. Has no feelings of guilt. Has good concentration and memory ability. Has energy. Is able to make goals. Is not crying a lot. Appetite is fair. Denies any suicidal or homicidal ideations. Does not have any auditory or visual hallucinations.    No complaints about any of the medications.    The following portions of the patient's history were reviewed and updated as appropriate: allergies, current medications, past family history, past medical history, past social history, past surgical history and problem list.    Past Medical History  Past Medical History:   Diagnosis Date   • Anxiety        Review of Systems  Constitutional: Denies any recent history of HAs, dizziness, fevers, chills, itching.  Eyes: Denies any changes in vision. Denies any blurry vision or diplopia.  Ears, Nose, Mouth, Throat: Denies any sore throat, rhinorrhea, or cough.  Cardiovascular: Denies any chest pain, pressure, or palpitations.  Respiratory: Denies any " shortness of breath or wheezing.  Gastrointestinal: Denies any abdominal pain, nausea, vomiting, diarrhea, or constipation.  Genitourinary: Denies any changes in urination.  Musculoskeletal: Denies any muscle weakness.  Skin and/or breasts: Denies any rashes.  Neurological: Denies any changes in balance or gait.  Psychiatric: Denies any suicidal or homicidal ideations.  Endocrine: Denies any heat or cold intolerance. Denies any voice changes, polydipsia, or polyuria.  Hematologic/Lymphatic: Denies any anemia or easy bruising.    Surgical History  Past Surgical History:   Procedure Laterality Date   • ADENOIDECTOMY     • TONSILLECTOMY         Family History  Family History   Problem Relation Age of Onset   • No Known Problems Mother    • No Known Problems Father        Social History  Social History     Socioeconomic History   • Marital status: Single     Spouse name: Not on file   • Number of children: Not on file   • Years of education: Not on file   • Highest education level: Not on file   Social Needs   • Financial resource strain: Not on file   • Food insecurity - worry: Not on file   • Food insecurity - inability: Not on file   • Transportation needs - medical: Not on file   • Transportation needs - non-medical: Not on file   Occupational History   • Not on file   Tobacco Use   • Smoking status: Never Smoker   • Smokeless tobacco: Never Used   Substance and Sexual Activity   • Alcohol use: No   • Drug use: No   • Sexual activity: Defer   Other Topics Concern   • Not on file   Social History Narrative   • Not on file       Objective  Physical Exam  Gen: Patient in NAD. Pleasant and answers appropriately. A&Ox3.    Skin: Warm and dry with normal turgor. No purpura, rashes, or unusual pigmentation noted. Hair is normal in appearance and distribution.    HEENT: NC/AT. No lesions noted. Conjunctiva clear, sclera nonicteric. PERRL. EOMI without nystagmus or strabismus. Fundi appear benign. No hemorrhages or exudates  of eyes. Auditory canals are patent bilaterally without lesions. TMs intact,  nonerythematous, nonbulging without lesions. Nasal mucosa pink, nonerythematous, and nonedematous. Frontal and maxillary sinuses are nontender. O/P nonerythematous and moist without exudate.    Neck: Supple without lymph nodes palpated. FROM.     Lungs: CTA B/L without rales, rhonchi, crackles, or wheezes.    Heart: RRR. S1 and S2 normal. No S3 or S4. No MRGT.    Abd: Soft, nontender,nondistended. (+)BSx4 quadrants.     Extrem: No CCE. Radial pulses 2+/4 and equal B/L. FROMx4. No bone, joint, or muscle tenderness noted.    Neuro: No focal motor/sensory deficits.    Procedures    Assessment/Plan  Dedra Diallo is a 23 y.o. here for medical followup.  Diagnoses and all orders for this visit:    Mild episode of recurrent major depressive disorder (CMS/HCC)  -     busPIRone (BUSPAR) 10 MG tablet; Take 1 tablet by mouth 3 (Three) Times a Day As Needed (anxiety).  -     sertraline (ZOLOFT) 50 MG tablet; Take 1.5 tablets by mouth Daily.  Will add buspar. Will increase zoloft to 75 mg orally daily.     Panic disorder    Immunization due  -     Hepatitis A Vaccine Adult IM        Patient's Body mass index is 26.96 kg/m². BMI is within normal parameters. No follow-up required.        Findings and plans discussed with patient who verbalizes understanding and agreement. Will followup with patient once results are in. Patient to followup at clinic PRN or in one month for further medical followup.    Rufina Smith MD

## 2019-05-24 ENCOUNTER — OFFICE VISIT (OUTPATIENT)
Dept: FAMILY MEDICINE CLINIC | Facility: CLINIC | Age: 24
End: 2019-05-24

## 2019-05-24 VITALS
TEMPERATURE: 98.8 F | DIASTOLIC BLOOD PRESSURE: 82 MMHG | HEIGHT: 65 IN | HEART RATE: 74 BPM | SYSTOLIC BLOOD PRESSURE: 120 MMHG | WEIGHT: 159 LBS | BODY MASS INDEX: 26.49 KG/M2

## 2019-05-24 DIAGNOSIS — J02.9 SORE THROAT: Primary | ICD-10-CM

## 2019-05-24 PROCEDURE — 99213 OFFICE O/P EST LOW 20 MIN: CPT | Performed by: FAMILY MEDICINE

## 2019-05-24 RX ORDER — PENICILLIN V POTASSIUM 500 MG/1
500 TABLET ORAL 2 TIMES DAILY
Qty: 20 TABLET | Refills: 0 | Status: SHIPPED | OUTPATIENT
Start: 2019-05-24 | End: 2019-06-03

## 2019-06-10 NOTE — PROGRESS NOTES
"Dedra Diallo     VITALS: Blood pressure 120/82, pulse 74, temperature 98.8 °F (37.1 °C), temperature source Oral, height 165.1 cm (65\"), weight 72.1 kg (159 lb), not currently breastfeeding.    Subjective  Chief Complaint:   Chief Complaint   Patient presents with   • Sore Throat   • URI   • Cough        History of Present Illness:  Patient is a 23 y.o.  female who presents to clinic secondary to an acute concern.  She has had a 4-day history of a sore throat.  She denies any coughing.  She has had chills and sweats.  She has not taken her temperature.  She states that she feels her lymph nodes.  No ear pain, congestion, rhinorrhea, shortness of breath, or chest pain.  No one else at home is sick.  She has not tried any over-the-counter medications.    No complaints about any of the medications.    The following portions of the patient's history were reviewed and updated as appropriate: allergies, current medications, past family history, past medical history, past social history, past surgical history and problem list.    Past Medical History  Past Medical History:   Diagnosis Date   • Anxiety        Review of Systems  Constitutional: Denies any recent history of HAs, dizziness, itching.  Eyes: Denies any changes in vision. Denies any blurry vision or diplopia.  Cardiovascular: Denies any chest pain, pressure, or palpitations.  Respiratory: Denies any shortness of breath or wheezing.  Gastrointestinal: Denies any abdominal pain, nausea, vomiting, diarrhea, or constipation.  Genitourinary: Denies any changes in urination.  Musculoskeletal: Denies any muscle weakness.  Skin and/or breasts: Denies any rashes.  Neurological: Denies any changes in balance or gait.  Psychiatric: Denies any anxiety, depression, or insomnia. Denies any suicidal or homicidal ideations.  Endocrine: Denies any heat or cold intolerance. Denies any voice changes, polydipsia, or polyuria.  Hematologic/Lymphatic: Denies any anemia or easy " bruising.    Surgical History  Past Surgical History:   Procedure Laterality Date   • ADENOIDECTOMY     • TONSILLECTOMY         Family History  Family History   Problem Relation Age of Onset   • No Known Problems Mother    • No Known Problems Father        Social History  Social History     Socioeconomic History   • Marital status: Single     Spouse name: Not on file   • Number of children: Not on file   • Years of education: Not on file   • Highest education level: Not on file   Tobacco Use   • Smoking status: Never Smoker   • Smokeless tobacco: Never Used   Substance and Sexual Activity   • Alcohol use: No   • Drug use: No   • Sexual activity: Defer       Objective  Physical Exam    Gen: Patient in NAD. Pleasant and answers appropriately. A&Ox3.    Skin: Warm and dry with normal turgor. No purpura, rashes, or unusual pigmentation noted. Hair is normal in appearance and distribution.    HEENT: NC/AT. No lesions noted. Conjunctiva clear, sclera nonicteric. PERRL. EOMI without nystagmus or strabismus. Fundi appear benign. No hemorrhages or exudates of eyes. Auditory canals are patent bilaterally without lesions. TMs intact,  nonerythematous, nonbulging without lesions. Nasal mucosa erythematous, and nonedematous. Frontal and maxillary sinuses are nontender. O/P erythematous and moist with exudate.  Enlarged tonsils.    Neck: Supple with lymph nodes palpated. FROM.     Lungs: CTA B/L without rales, rhonchi, crackles, or wheezes.    Heart: RRR. S1 and S2 normal. No S3 or S4. No MRGT.    Abd: Soft, nontender,nondistended. (+)BSx4 quadrants.     Extrem: No CCE. Radial pulses 2+/4 and equal B/L. FROMx4. No bone, joint, or muscle tenderness noted.    Neuro: No focal motor/sensory deficits.    Procedures    Assessment/Plan  Dedra Diallo is a 23 y.o. here for medical followup.  Diagnoses and all orders for this visit:    Sore throat  -     penicillin v potassium (VEETID) 500 MG tablet; Take 1 tablet by mouth 2 (Two) Times  a Day for 10 days.  We will treat for presumptive strep.  Supportive care indicated, including increased fluids and rest. Patient to monitor. Patient to call if symptoms continue or worsen.       Patient's Body mass index is 26.46 kg/m². BMI is above normal parameters. Recommendations include: exercise counseling and nutrition counseling.     Findings and plans discussed with patient who verbalizes understanding and agreement. Will followup with patient once results are in. Patient to followup at clinic PRN or in three months for further medical followup.    Rufina Smith MD    EMR Dragon/Transcription Disclaimer:  Much of this encounter note is an electronic transcription/translation of spoken language to printed text.  The electronic translation of spoken language may permit erroneous, or at times, nonsensical words or phrases to be inadvertently transcribed.  Although I have reviewed the note for such errors, some may still exist.

## 2019-06-25 ENCOUNTER — OFFICE VISIT (OUTPATIENT)
Dept: FAMILY MEDICINE CLINIC | Facility: CLINIC | Age: 24
End: 2019-06-25

## 2019-06-25 VITALS
OXYGEN SATURATION: 99 % | DIASTOLIC BLOOD PRESSURE: 80 MMHG | HEIGHT: 65 IN | HEART RATE: 86 BPM | SYSTOLIC BLOOD PRESSURE: 118 MMHG | WEIGHT: 158 LBS | BODY MASS INDEX: 26.33 KG/M2 | TEMPERATURE: 98.6 F

## 2019-06-25 DIAGNOSIS — F41.0 PANIC DISORDER: Primary | ICD-10-CM

## 2019-06-25 DIAGNOSIS — F33.0 MILD EPISODE OF RECURRENT MAJOR DEPRESSIVE DISORDER (HCC): ICD-10-CM

## 2019-06-25 DIAGNOSIS — Z30.09 UNWANTED FERTILITY: ICD-10-CM

## 2019-06-25 DIAGNOSIS — Z86.69 HX OF MIGRAINES: ICD-10-CM

## 2019-06-25 DIAGNOSIS — Z23 IMMUNIZATION DUE: ICD-10-CM

## 2019-06-25 PROCEDURE — 90632 HEPA VACCINE ADULT IM: CPT | Performed by: FAMILY MEDICINE

## 2019-06-25 PROCEDURE — 90471 IMMUNIZATION ADMIN: CPT | Performed by: FAMILY MEDICINE

## 2019-06-25 PROCEDURE — 99214 OFFICE O/P EST MOD 30 MIN: CPT | Performed by: FAMILY MEDICINE

## 2019-06-25 RX ORDER — NORETHINDRONE ACETATE AND ETHINYL ESTRADIOL .03; 1.5 MG/1; MG/1
1 TABLET ORAL DAILY
Qty: 28 EACH | Refills: 11 | Status: SHIPPED | OUTPATIENT
Start: 2019-06-25 | End: 2021-12-22

## 2019-06-25 RX ORDER — SUMATRIPTAN 20 MG/1
1 SPRAY NASAL
Qty: 1 EACH | Refills: 11 | Status: SHIPPED | OUTPATIENT
Start: 2019-06-25 | End: 2022-08-04

## 2019-06-25 RX ORDER — BUSPIRONE HYDROCHLORIDE 10 MG/1
10 TABLET ORAL 3 TIMES DAILY PRN
Qty: 90 TABLET | Refills: 11 | Status: SHIPPED | OUTPATIENT
Start: 2019-06-25 | End: 2022-08-04

## 2019-07-14 NOTE — PROGRESS NOTES
"Dedra Diallo     VITALS: Blood pressure 118/80, pulse 86, temperature 98.6 °F (37 °C), temperature source Oral, height 165.1 cm (65\"), weight 71.7 kg (158 lb), SpO2 99 %, not currently breastfeeding.    Subjective  Chief Complaint:   Chief Complaint   Patient presents with   • Panic attack and migraine        History of Present Illness:  Patient is a 24+ y.o.  female who presents to clinic secondary to medical followup. No new or acute concerns. She is doing well.      Patient has a history of migraines and is currently controlled on imitrex nasal spray. She utilizes the nasal spray several times a month and the migraines stop. Denies any side effects of the medications. Has declined prophylactic medications in the past.      Patient has a history of depression with anxiety and is currently on zoloft 50 mg orally daily and buspar 10 mg orally TID PRN anxiety. Patient states that this medication is not working. Denies any side effects of the medication. Patient states that she is sleeping well and can get out of bed daily to accomplish daily activities. Patient has no anhedonia. Mood is stable. Has no feelings of guilt. Has good concentration and memory ability. Has energy. Is able to make goals. Is not crying a lot. Appetite is fair. Denies any suicidal or homicidal ideations. Does not have any auditory or visual hallucinations.    No complaints about any of the medications.    The following portions of the patient's history were reviewed and updated as appropriate: allergies, current medications, past family history, past medical history, past social history, past surgical history and problem list.    Past Medical History  Past Medical History:   Diagnosis Date   • Anxiety        Review of Systems  Constitutional: Denies any recent history of HAs, dizziness, fevers, chills, itching.  Eyes: Denies any changes in vision. Denies any blurry vision or diplopia.  Ears, Nose, Mouth, Throat: Denies any sore throat, " rhinorrhea, or cough.  Cardiovascular: Denies any chest pain, pressure, or palpitations.  Respiratory: Denies any shortness of breath or wheezing.  Gastrointestinal: Denies any abdominal pain, nausea, vomiting, diarrhea, or constipation.  Genitourinary: Denies any changes in urination.  Musculoskeletal: Denies any muscle weakness.  Skin and/or breasts: Denies any rashes.  Neurological: Denies any changes in balance or gait.  Psychiatric: Denies any anxiety, depression, or insomnia. Denies any suicidal or homicidal ideations.  Endocrine: Denies any heat or cold intolerance. Denies any voice changes, polydipsia, or polyuria.  Hematologic/Lymphatic: Denies any anemia or easy bruising.    Surgical History  Past Surgical History:   Procedure Laterality Date   • ADENOIDECTOMY     • TONSILLECTOMY         Family History  Family History   Problem Relation Age of Onset   • No Known Problems Mother    • No Known Problems Father        Social History  Social History     Socioeconomic History   • Marital status: Single     Spouse name: Not on file   • Number of children: Not on file   • Years of education: Not on file   • Highest education level: Not on file   Tobacco Use   • Smoking status: Never Smoker   • Smokeless tobacco: Never Used   Substance and Sexual Activity   • Alcohol use: No   • Drug use: No   • Sexual activity: Defer       Objective  Physical Exam    Gen: Patient in NAD. Pleasant and answers appropriately. A&Ox3.    Skin: Warm and dry with normal turgor. No purpura, rashes, or unusual pigmentation noted. Hair is normal in appearance and distribution.    HEENT: NC/AT. No lesions noted. Conjunctiva clear, sclera nonicteric. PERRL. EOMI without nystagmus or strabismus. Fundi appear benign. No hemorrhages or exudates of eyes. Auditory canals are patent bilaterally without lesions. TMs intact,  nonerythematous, nonbulging without lesions. Nasal mucosa pink, nonerythematous, and nonedematous. Frontal and maxillary  sinuses are nontender. O/P nonerythematous and moist without exudate.    Neck: Supple without lymph nodes palpated. FROM.     Lungs: CTA B/L without rales, rhonchi, crackles, or wheezes.    Heart: RRR. S1 and S2 normal. No S3 or S4. No MRGT.    Abd: Soft, nontender,nondistended. (+)BSx4 quadrants.     Extrem: No CCE. Radial pulses 2+/4 and equal B/L. FROMx4. No bone, joint, or muscle tenderness noted.    Neuro: No focal motor/sensory deficits.    Procedures    Assessment/Plan  Dedra Diallo is a 24 y.o. here for medical followup.  Diagnoses and all orders for this visit:    Panic disorder  -     sertraline (ZOLOFT) 50 MG tablet; Take 1.5 tablets by mouth Daily.  -     busPIRone (BUSPAR) 10 MG tablet; Take 1 tablet by mouth 3 (Three) Times a Day As Needed (anxiety).    Immunization due  -     Hepatitis A Vaccine Adult IM    Hx of migraines  -     SUMAtriptan (IMITREX) 20 MG/ACT nasal spray; 1 spray into the nostril(s) as directed by provider Every 2 (Two) Hours As Needed for Migraine. Spray in opposite nostril of migraine.    Mild episode of recurrent major depressive disorder (CMS/HCC)  -     sertraline (ZOLOFT) 50 MG tablet; Take 1.5 tablets by mouth Daily.  -     busPIRone (BUSPAR) 10 MG tablet; Take 1 tablet by mouth 3 (Three) Times a Day As Needed (anxiety).    Unwanted fertility  -     Norethindrone Acet-Ethinyl Est (JUNEL 1.5/30) 1.5-30 MG-MCG tablet; Take 1 tablet by mouth Daily.      Patient's Body mass index is 26.29 kg/m². BMI is above normal parameters. Recommendations include: exercise counseling and nutrition counseling.        Findings and plans discussed with patient who verbalizes understanding and agreement. Will followup with patient once results are in. Patient to followup at clinic PRN or in six months for further medical followup.    Rufina Smith MD    EMR Dragon/Transcription Disclaimer:  Much of this encounter note is an electronic transcription/translation of spoken language to printed  text.  The electronic translation of spoken language may permit erroneous, or at times, nonsensical words or phrases to be inadvertently transcribed.  Although I have reviewed the note for such errors, some may still exist.

## 2019-12-20 ENCOUNTER — TELEPHONE (OUTPATIENT)
Dept: FAMILY MEDICINE CLINIC | Facility: CLINIC | Age: 24
End: 2019-12-20

## 2019-12-20 NOTE — TELEPHONE ENCOUNTER
Aw! Tell her congratulations.     She needs to:  1) Start prenatals immediately. OTC.  2) Does she think she can get off of the zoloft? That would be the best bet. The official standing on zoloft while pregnant is:    Caution advised during pregnancy, clint. in 3rd trimester; risk of fetal harm low, though risk of  withdrawal sx or serotonin syndrome based on human data; risk of  persistent pulmonary HTN or autism inconclusive.     She is on 75 mg. I would drop down to 50 mg for a couple of days and then discontinue and see what happens. If she is having problems, tell her to make an appointment and we can discuss.     3) Buspar is okay, but I would prefer her just not to be on anything.   4) Do not use her headache medication either. It causes the blood vessels to expand and contract, thus increasing risk not getting enough blood flow to fetus.     If she wants to come in and talk to me about all of this, just make her an appointment.

## 2019-12-20 NOTE — TELEPHONE ENCOUNTER
Patient called reports she is taking Zoloft & has just found out she is pregnant,wants to know what she should do?

## 2019-12-20 NOTE — TELEPHONE ENCOUNTER
Aw! Tell her congratulations.     She needs to:  1) Start prenatals immediately. OTC.  2) Does she think she can get off of the zoloft? That would be the best bet. The official standing on zoloft while pregnant is:    Caution advised during pregnancy, clint. in 3rd trimester; risk of fetal harm low, though risk of  withdrawal sx or serotonin syndrome based on human data; risk of  persistent pulmonary HTN or autism inconclusive.     She is on 75 mg. I would drop down to 50 mg for a couple of days and then discontinue and see what happens. If she is having problems, tell her to make an appointment and we can discuss.     3) Buspar is okay, but I would prefer her just not to be on anything.   4) Do not use her headache medication either. It causes the blood vessels to expand and contract, thus increasing risk not getting enough blood flow to fetus.     If she wants to come in and talk to me about all of this, just make her an appointment.        Patient notified & verbalized understanding.        Left a message to return call.

## 2021-03-18 ENCOUNTER — BULK ORDERING (OUTPATIENT)
Dept: CASE MANAGEMENT | Facility: OTHER | Age: 26
End: 2021-03-18

## 2021-03-18 DIAGNOSIS — Z23 IMMUNIZATION DUE: ICD-10-CM

## 2021-12-22 ENCOUNTER — TELEMEDICINE (OUTPATIENT)
Dept: FAMILY MEDICINE CLINIC | Facility: CLINIC | Age: 26
End: 2021-12-22

## 2021-12-22 DIAGNOSIS — Z30.9 ENCOUNTER FOR CONTRACEPTIVE MANAGEMENT, UNSPECIFIED TYPE: Primary | ICD-10-CM

## 2021-12-22 PROCEDURE — 99213 OFFICE O/P EST LOW 20 MIN: CPT | Performed by: FAMILY MEDICINE

## 2021-12-22 RX ORDER — NORGESTIMATE AND ETHINYL ESTRADIOL 0.25-0.035
1 KIT ORAL DAILY
Qty: 28 TABLET | Refills: 12 | Status: SHIPPED | OUTPATIENT
Start: 2021-12-22

## 2021-12-22 NOTE — PROGRESS NOTES
Dedra ALAN: not currently breastfeeding.    Subjective  Chief Complaint: contraception     History of Present Illness:  You have chosen to receive care through a telehealth visit.  Do you consent to use a video/audio connection for your medical care today? Yes    The use of a video visit has been reviewed with the patient and verbal informed consent has been obtained.    This visit was conducted with the use of a interactive audio and video telecommunication system that permits real-time communication between patient and provider.  Patient consent for this virtual visit was obtained before the visit.  The patient was at their home under the 2020 Coronavirus Preparedness and Respond  Supplemental Appropriation Act.  Provider was at the office.    Subjective            Patient is a 26 y.o.  female with needing a refill on her birth control. States her last pap was October 2020. Has not had any issues. Had a baby 16 months ago. States she is not currently having any issues at this time.     No complaints about any of the medications.    The following portions of the patient's history were reviewed and updated as appropriate: allergies, current medications, past family history, past medical history, past social history, past surgical history and problem list.    Past Medical History  Past Medical History:   Diagnosis Date   • Anxiety        Review of Systems    Surgical History  Past Surgical History:   Procedure Laterality Date   • ADENOIDECTOMY     • TONSILLECTOMY         Family History  Family History   Problem Relation Age of Onset   • No Known Problems Mother    • No Known Problems Father        Social History  Social History     Socioeconomic History   • Marital status: Single   Tobacco Use   • Smoking status: Never Smoker   • Smokeless tobacco: Never Used   Substance and Sexual Activity   • Alcohol use: No   • Drug use: No   • Sexual activity: Defer       Objective  Physical Exam   Constitutional:  She appears well-developed.   HENT:   Head: Normocephalic.   Eyes: Pupils are equal, round, and reactive to light.   Pulmonary/Chest: Effort normal.   Abdominal: Soft.   Neurological: She is alert.       Limited exam due to telehealth visit.    Procedures    Result Review :   The following data was reviewed by: LUZ Julio on 12/22/2021:               Assessment and Plan    Dedra Diallo is a 26 y.o. here for an acute concern.    Problem List Items Addressed This Visit     None      Visit Diagnoses     Encounter for contraceptive management, unspecified type    -  Primary    Relevant Medications    norgestimate-ethinyl estradiol (Sprintec 28) 0.25-35 MG-MCG per tablet        Findings and plans discussed with patient who verbalizes understanding and agreement. Will followup with patient once results are in. Patient to followup at clinic PRN or in 6 for further medical followup.      Follow Up   Return in about 6 months (around 6/22/2022).  Findings and plans discussed with patient who verbalizes understanding and agreement. Will followup with patient once results are in. Patient was given instructions and counseling regarding her condition or for health maintenance advice. Please see specific information pulled into the AVS if appropriate.       LUZ Julio    EMR Dragon/Transcription Disclaimer:  Much of this encounter note is an electronic transcription/translation of spoken language to printed text.

## 2022-06-17 ENCOUNTER — TRANSCRIBE ORDERS (OUTPATIENT)
Dept: ADMINISTRATIVE | Facility: HOSPITAL | Age: 27
End: 2022-06-17

## 2022-06-17 DIAGNOSIS — E04.9 ENLARGEMENT OF THYROID: Primary | ICD-10-CM

## 2022-07-01 ENCOUNTER — HOSPITAL ENCOUNTER (OUTPATIENT)
Dept: ULTRASOUND IMAGING | Facility: HOSPITAL | Age: 27
Discharge: HOME | End: 2022-07-01
Admitting: FAMILY MEDICINE

## 2022-07-01 DIAGNOSIS — E04.9 ENLARGEMENT OF THYROID: ICD-10-CM

## 2022-07-01 PROCEDURE — 76536 US EXAM OF HEAD AND NECK: CPT

## 2022-07-01 PROCEDURE — 76536 US EXAM OF HEAD AND NECK: CPT | Performed by: RADIOLOGY

## 2022-07-18 ENCOUNTER — APPOINTMENT (OUTPATIENT)
Dept: ULTRASOUND IMAGING | Facility: HOSPITAL | Age: 27
End: 2022-07-18

## 2022-07-20 ENCOUNTER — APPOINTMENT (OUTPATIENT)
Dept: GENERAL RADIOLOGY | Facility: HOSPITAL | Age: 27
End: 2022-07-20

## 2022-07-20 ENCOUNTER — HOSPITAL ENCOUNTER (EMERGENCY)
Facility: HOSPITAL | Age: 27
Discharge: HOME | End: 2022-07-20
Attending: EMERGENCY MEDICINE | Admitting: EMERGENCY MEDICINE

## 2022-07-20 VITALS
WEIGHT: 145 LBS | HEART RATE: 90 BPM | RESPIRATION RATE: 18 BRPM | OXYGEN SATURATION: 100 % | TEMPERATURE: 98.1 F | BODY MASS INDEX: 24.16 KG/M2 | SYSTOLIC BLOOD PRESSURE: 122 MMHG | HEIGHT: 65 IN | DIASTOLIC BLOOD PRESSURE: 96 MMHG

## 2022-07-20 DIAGNOSIS — R07.9 CHEST PAIN, UNSPECIFIED TYPE: Primary | ICD-10-CM

## 2022-07-20 LAB
ALBUMIN SERPL-MCNC: 4.53 G/DL (ref 3.5–5.2)
ALBUMIN/GLOB SERPL: 1.6 G/DL
ALP SERPL-CCNC: 93 U/L (ref 39–117)
ALT SERPL W P-5'-P-CCNC: 9 U/L (ref 1–33)
ANION GAP SERPL CALCULATED.3IONS-SCNC: 10.1 MMOL/L (ref 5–15)
AST SERPL-CCNC: 13 U/L (ref 1–32)
BASOPHILS # BLD AUTO: 0.05 10*3/MM3 (ref 0–0.2)
BASOPHILS NFR BLD AUTO: 0.8 % (ref 0–1.5)
BILIRUB SERPL-MCNC: 0.5 MG/DL (ref 0–1.2)
BUN SERPL-MCNC: 10 MG/DL (ref 6–20)
BUN/CREAT SERPL: 26.3 (ref 7–25)
CALCIUM SPEC-SCNC: 9.7 MG/DL (ref 8.6–10.5)
CHLORIDE SERPL-SCNC: 102 MMOL/L (ref 98–107)
CO2 SERPL-SCNC: 25.9 MMOL/L (ref 22–29)
CREAT SERPL-MCNC: 0.38 MG/DL (ref 0.57–1)
D DIMER PPP FEU-MCNC: <0.27 MCGFEU/ML (ref 0–0.5)
DEPRECATED RDW RBC AUTO: 41.8 FL (ref 37–54)
EGFRCR SERPLBLD CKD-EPI 2021: 141.9 ML/MIN/1.73
EOSINOPHIL # BLD AUTO: 0.05 10*3/MM3 (ref 0–0.4)
EOSINOPHIL NFR BLD AUTO: 0.8 % (ref 0.3–6.2)
ERYTHROCYTE [DISTWIDTH] IN BLOOD BY AUTOMATED COUNT: 13 % (ref 12.3–15.4)
GLOBULIN UR ELPH-MCNC: 2.8 GM/DL
GLUCOSE SERPL-MCNC: 90 MG/DL (ref 65–99)
HCG SERPL QL: NEGATIVE
HCT VFR BLD AUTO: 43.8 % (ref 34–46.6)
HGB BLD-MCNC: 14.5 G/DL (ref 12–15.9)
HOLD SPECIMEN: NORMAL
IMM GRANULOCYTES # BLD AUTO: 0.01 10*3/MM3 (ref 0–0.05)
IMM GRANULOCYTES NFR BLD AUTO: 0.2 % (ref 0–0.5)
LYMPHOCYTES # BLD AUTO: 2.03 10*3/MM3 (ref 0.7–3.1)
LYMPHOCYTES NFR BLD AUTO: 30.9 % (ref 19.6–45.3)
MCH RBC QN AUTO: 29 PG (ref 26.6–33)
MCHC RBC AUTO-ENTMCNC: 33.1 G/DL (ref 31.5–35.7)
MCV RBC AUTO: 87.6 FL (ref 79–97)
MONOCYTES # BLD AUTO: 0.4 10*3/MM3 (ref 0.1–0.9)
MONOCYTES NFR BLD AUTO: 6.1 % (ref 5–12)
NEUTROPHILS NFR BLD AUTO: 4.03 10*3/MM3 (ref 1.7–7)
NEUTROPHILS NFR BLD AUTO: 61.2 % (ref 42.7–76)
NRBC BLD AUTO-RTO: 0 /100 WBC (ref 0–0.2)
PLATELET # BLD AUTO: 312 10*3/MM3 (ref 140–450)
PMV BLD AUTO: 9.5 FL (ref 6–12)
POTASSIUM SERPL-SCNC: 4.3 MMOL/L (ref 3.5–5.2)
PROT SERPL-MCNC: 7.3 G/DL (ref 6–8.5)
RBC # BLD AUTO: 5 10*6/MM3 (ref 3.77–5.28)
SODIUM SERPL-SCNC: 138 MMOL/L (ref 136–145)
T4 FREE SERPL-MCNC: 1.35 NG/DL (ref 0.93–1.7)
TROPONIN T SERPL-MCNC: <0.01 NG/ML (ref 0–0.03)
TSH SERPL DL<=0.05 MIU/L-ACNC: 0.76 UIU/ML (ref 0.27–4.2)
WBC NRBC COR # BLD: 6.57 10*3/MM3 (ref 3.4–10.8)
WHOLE BLOOD HOLD COAG: NORMAL
WHOLE BLOOD HOLD SPECIMEN: NORMAL

## 2022-07-20 PROCEDURE — 85379 FIBRIN DEGRADATION QUANT: CPT | Performed by: PHYSICIAN ASSISTANT

## 2022-07-20 PROCEDURE — 71045 X-RAY EXAM CHEST 1 VIEW: CPT

## 2022-07-20 PROCEDURE — 84703 CHORIONIC GONADOTROPIN ASSAY: CPT | Performed by: PHYSICIAN ASSISTANT

## 2022-07-20 PROCEDURE — 36415 COLL VENOUS BLD VENIPUNCTURE: CPT

## 2022-07-20 PROCEDURE — 93005 ELECTROCARDIOGRAM TRACING: CPT

## 2022-07-20 PROCEDURE — 84484 ASSAY OF TROPONIN QUANT: CPT | Performed by: PHYSICIAN ASSISTANT

## 2022-07-20 PROCEDURE — 93010 ELECTROCARDIOGRAM REPORT: CPT | Performed by: INTERNAL MEDICINE

## 2022-07-20 PROCEDURE — 71045 X-RAY EXAM CHEST 1 VIEW: CPT | Performed by: RADIOLOGY

## 2022-07-20 PROCEDURE — 93005 ELECTROCARDIOGRAM TRACING: CPT | Performed by: EMERGENCY MEDICINE

## 2022-07-20 PROCEDURE — 80050 GENERAL HEALTH PANEL: CPT | Performed by: PHYSICIAN ASSISTANT

## 2022-07-20 PROCEDURE — 84481 FREE ASSAY (FT-3): CPT | Performed by: PHYSICIAN ASSISTANT

## 2022-07-20 PROCEDURE — 99283 EMERGENCY DEPT VISIT LOW MDM: CPT

## 2022-07-20 PROCEDURE — 84439 ASSAY OF FREE THYROXINE: CPT | Performed by: PHYSICIAN ASSISTANT

## 2022-07-20 RX ORDER — SODIUM CHLORIDE 0.9 % (FLUSH) 0.9 %
10 SYRINGE (ML) INJECTION AS NEEDED
Status: DISCONTINUED | OUTPATIENT
Start: 2022-07-20 | End: 2022-07-20 | Stop reason: HOSPADM

## 2022-07-20 RX ORDER — ASPIRIN 81 MG/1
324 TABLET, CHEWABLE ORAL ONCE
Status: COMPLETED | OUTPATIENT
Start: 2022-07-20 | End: 2022-07-20

## 2022-07-20 RX ADMIN — ASPIRIN 324 MG: 81 TABLET, CHEWABLE ORAL at 17:10

## 2022-07-20 NOTE — ED NOTES
MEDICAL SCREENING:    Reason for Visit: Patient reports that she was diagnosed with overactive thyroid and is lost approximately 40 pounds.  She states that she been having palpitations along with chest pain that is sharp in the center of her chest.      Patient initially seen in triage.  The patient was advised further evaluation and diagnostic testing will be needed, some of the treatment and testing will be initiated in the lobby in order to begin the process.  The patient will be returned to the waiting area for the time being and possibly be re-assessed by a subsequent ED provider.  The patient will be brought back to the treatment area in as timely manner as possible.         Curt Zaldivar PA  07/20/22 1848

## 2022-07-20 NOTE — ED PROVIDER NOTES
Subjective   28 yo female patient presents to the ED with complaints of mid chest pain and palpations that started   3 days ago. Patient states she was recently diagnosed with overactive thyroid but has not seen endocrinology. Reports she has appt next month. Patient denies any SOB. Patient reports hx of anxiety. denies any worsening or alleviating factors.      History provided by:  Patient   used: No        Review of Systems   Constitutional: Negative.    HENT: Negative.    Eyes: Negative.    Respiratory: Negative.    Cardiovascular: Positive for chest pain and palpitations.   Gastrointestinal: Negative.    Endocrine: Negative.    Genitourinary: Negative.    Musculoskeletal: Negative.    Skin: Negative.    Allergic/Immunologic: Negative.    Neurological: Negative.    Hematological: Negative.    Psychiatric/Behavioral: Negative.    All other systems reviewed and are negative.      Past Medical History:   Diagnosis Date   • Anxiety        No Known Allergies    Past Surgical History:   Procedure Laterality Date   • ADENOIDECTOMY     • TONSILLECTOMY         Family History   Problem Relation Age of Onset   • No Known Problems Mother    • No Known Problems Father        Social History     Socioeconomic History   • Marital status: Single   Tobacco Use   • Smoking status: Never Smoker   • Smokeless tobacco: Never Used   Substance and Sexual Activity   • Alcohol use: No   • Drug use: No   • Sexual activity: Defer           Objective   Physical Exam  Vitals and nursing note reviewed.   Constitutional:       Appearance: She is well-developed and normal weight.   HENT:      Head: Normocephalic and atraumatic.   Eyes:      Extraocular Movements: Extraocular movements intact.      Pupils: Pupils are equal, round, and reactive to light.   Cardiovascular:      Rate and Rhythm: Normal rate and regular rhythm.      Heart sounds: Normal heart sounds.   Pulmonary:      Effort: Pulmonary effort is normal.    Abdominal:      General: Bowel sounds are normal.      Palpations: Abdomen is soft.   Musculoskeletal:         General: Normal range of motion.      Cervical back: Normal range of motion and neck supple.   Skin:     General: Skin is dry.      Capillary Refill: Capillary refill takes less than 2 seconds.   Neurological:      General: No focal deficit present.      Mental Status: She is alert and oriented to person, place, and time.   Psychiatric:         Mood and Affect: Mood normal.         Behavior: Behavior normal.         Procedures           ED Course  ED Course as of 07/20/22 1900 Wed Jul 20, 2022   1646 Sinus rhythm with PVCs.  93 bpm.  Normal QTC/WI intervals.  No acute signs of ischemia. [LS]   1826 XR Chest 1 View    IMPRESSION:  No radiographic evidence of acute cardiac or pulmonary disease.     This report was finalized on 7/20/2022 4:57 PM by Dr. Vadim Saldivar MD.             Specimen Collected: 07/20/22 16:57 Last Resulted: 07/20/22 16:57         [ML]   1857 Pt is requesting dc home. She does not want to have a second troponin. States she is feeling better. She is asymptomatic and wants to go home.  Vitals stable and WNL. Instructions to f/u with PCP. Discussed sx and red flags that would warrant return to the ED.  [ML]   1900 Heart Score 0 [ML]      ED Course User Index  [LS] Leydi Ogden MD  [ML] Breanna Matute PA                                           MDM  Number of Diagnoses or Management Options     Amount and/or Complexity of Data Reviewed  Clinical lab tests: ordered and reviewed  Tests in the radiology section of CPT®: ordered and reviewed    Risk of Complications, Morbidity, and/or Mortality  Presenting problems: low  Diagnostic procedures: low  Management options: low    Patient Progress  Patient progress: improved      Final diagnoses:   Chest pain, unspecified type       ED Disposition  ED Disposition     ED Disposition   Discharge    Condition   Stable    Comment    --             Karissa Huff, APRN  [address]  [address] [address]  [phone]    Schedule an appointment as soon as possible for a visit in 1 day           Medication List      No changes were made to your prescriptions during this visit.          Breanna Matute PA  07/20/22 0836

## 2022-07-21 LAB
QT INTERVAL: 348 MS
QTC INTERVAL: 432 MS
T3FREE SERPL-MCNC: 3.65 PG/ML (ref 2–4.4)

## 2022-08-04 ENCOUNTER — OFFICE VISIT (OUTPATIENT)
Dept: ENDOCRINOLOGY | Facility: CLINIC | Age: 27
End: 2022-08-04

## 2022-08-04 ENCOUNTER — LAB (OUTPATIENT)
Dept: LAB | Facility: HOSPITAL | Age: 27
End: 2022-08-04

## 2022-08-04 VITALS
WEIGHT: 148 LBS | SYSTOLIC BLOOD PRESSURE: 120 MMHG | OXYGEN SATURATION: 98 % | BODY MASS INDEX: 24.66 KG/M2 | HEIGHT: 65 IN | HEART RATE: 91 BPM | DIASTOLIC BLOOD PRESSURE: 68 MMHG

## 2022-08-04 DIAGNOSIS — R94.6 ABNORMAL THYROID FUNCTION TEST: Primary | ICD-10-CM

## 2022-08-04 PROCEDURE — 83520 IMMUNOASSAY QUANT NOS NONAB: CPT | Performed by: NURSE PRACTITIONER

## 2022-08-04 PROCEDURE — 86376 MICROSOMAL ANTIBODY EACH: CPT | Performed by: NURSE PRACTITIONER

## 2022-08-04 PROCEDURE — 84443 ASSAY THYROID STIM HORMONE: CPT | Performed by: NURSE PRACTITIONER

## 2022-08-04 PROCEDURE — 99213 OFFICE O/P EST LOW 20 MIN: CPT | Performed by: NURSE PRACTITIONER

## 2022-08-04 PROCEDURE — 86800 THYROGLOBULIN ANTIBODY: CPT | Performed by: NURSE PRACTITIONER

## 2022-08-04 PROCEDURE — 84445 ASSAY OF TSI GLOBULIN: CPT | Performed by: NURSE PRACTITIONER

## 2022-08-04 PROCEDURE — 36415 COLL VENOUS BLD VENIPUNCTURE: CPT | Performed by: NURSE PRACTITIONER

## 2022-08-04 PROCEDURE — 84439 ASSAY OF FREE THYROXINE: CPT | Performed by: NURSE PRACTITIONER

## 2022-08-04 PROCEDURE — 84481 FREE ASSAY (FT-3): CPT | Performed by: NURSE PRACTITIONER

## 2022-08-04 RX ORDER — VENLAFAXINE 75 MG/1
75 TABLET ORAL 2 TIMES DAILY
COMMUNITY

## 2022-08-04 RX ORDER — HYDROXYZINE PAMOATE 50 MG/1
50 CAPSULE ORAL 3 TIMES DAILY PRN
COMMUNITY

## 2022-08-04 RX ORDER — METOPROLOL SUCCINATE 25 MG/1
25 TABLET, EXTENDED RELEASE ORAL DAILY
COMMUNITY

## 2022-08-04 NOTE — PROGRESS NOTES
Chief Complaint   Patient presents with   • Hyperthyroidism        Referring Provider  Karissa Huff APRN HPI   Dedra Kellogg is a 27 y.o. female had concerns including Hyperthyroidism.   Seen as a new patient.    She had COVID in Jan and since then has continued to have symptoms.She has lost 40 #'s since February with increased palpitations and hair loss.    She has been taking biotin, but has not had any in the last 6 days due to being out of it.    Birth state:[place]  Previous history of radiation to face/neck:none  Consuming foods high in iodine:no  Family history of thyroid complications: maternal grandmother, graves' disease    Past Medical History:   Diagnosis Date   • Anxiety    • Hyperthyroidism      Past Surgical History:   Procedure Laterality Date   • ADENOIDECTOMY     • TONSILLECTOMY        Family History   Problem Relation Age of Onset   • Diabetes Mother    • No Known Problems Father    • Diabetes Maternal Grandfather    • Cancer Maternal Grandmother    • Cancer Paternal Grandmother       Social History     Socioeconomic History   • Marital status: Single   Tobacco Use   • Smoking status: Never Smoker   • Smokeless tobacco: Never Used   Substance and Sexual Activity   • Alcohol use: No   • Drug use: No   • Sexual activity: Defer      No Known Allergies   Current Outpatient Medications on File Prior to Visit   Medication Sig Dispense Refill   • hydrOXYzine pamoate (VISTARIL) 50 MG capsule Take 50 mg by mouth 3 (Three) Times a Day As Needed for Itching.     • metoprolol succinate XL (TOPROL-XL) 25 MG 24 hr tablet Take 25 mg by mouth Daily.     • norgestimate-ethinyl estradiol (Sprintec 28) 0.25-35 MG-MCG per tablet Take 1 tablet by mouth Daily. 28 tablet 12   • venlafaxine (EFFEXOR) 75 MG tablet Take 75 mg by mouth 2 (Two) Times a Day.     • [DISCONTINUED] busPIRone (BUSPAR) 10 MG tablet Take 1 tablet by mouth 3 (Three) Times a Day As Needed (anxiety). 90 tablet 11   • [DISCONTINUED] sertraline  "(ZOLOFT) 50 MG tablet Take 1.5 tablets by mouth Daily. 45 tablet 11   • [DISCONTINUED] SUMAtriptan (IMITREX) 20 MG/ACT nasal spray 1 spray into the nostril(s) as directed by provider Every 2 (Two) Hours As Needed for Migraine. Spray in opposite nostril of migraine. 1 each 11     No current facility-administered medications on file prior to visit.      The following portions of the patient's history were reviewed and updated as appropriate: allergies, current medications, past family history, past medical history, past social history, past surgical history and problem list.    Review of Systems   Constitutional: Positive for fatigue and unexpected weight loss.   Eyes: Positive for blurred vision.   Cardiovascular: Positive for palpitations.   Endocrine: Negative for cold intolerance and heat intolerance.   Skin: Positive for dry skin.        Significant hair loss   Neurological: Negative for tremors.   Psychiatric/Behavioral: Positive for agitation and sleep disturbance. The patient is nervous/anxious.    All other systems reviewed and are negative.     /68 (BP Location: Left arm, Patient Position: Sitting, Cuff Size: Adult)   Pulse 91   Ht 165.1 cm (65\") Comment: per pt  Wt 67.1 kg (148 lb)   SpO2 98%   BMI 24.63 kg/m²      Physical Exam  Vitals reviewed.   Constitutional:       Appearance: Normal appearance.   Eyes:      Extraocular Movements: Extraocular movements intact.   Neck:      Thyroid: No thyroid mass, thyromegaly or thyroid tenderness.   Cardiovascular:      Rate and Rhythm: Normal rate and regular rhythm.      Pulses: Normal pulses.      Heart sounds: Normal heart sounds.   Pulmonary:      Effort: Pulmonary effort is normal.      Breath sounds: Normal breath sounds.   Skin:     General: Skin is warm and dry.   Neurological:      Mental Status: She is alert and oriented to person, place, and time.   Psychiatric:         Mood and Affect: Mood normal.         Behavior: Behavior normal.         " Thought Content: Thought content normal.         Judgment: Judgment normal.       Labs/Imaging  CMP  Lab Results   Component Value Date    GLUCOSE 90 07/20/2022    BUN 10 07/20/2022    CREATININE 0.38 (L) 07/20/2022    BCR 26.3 (H) 07/20/2022    K 4.3 07/20/2022    CO2 25.9 07/20/2022    CALCIUM 9.7 07/20/2022    ALBUMIN 4.53 07/20/2022    AST 13 07/20/2022    ALT 9 07/20/2022        CBC w/DIFF   Lab Results   Component Value Date    WBC 6.57 07/20/2022    RBC 5.00 07/20/2022    HGB 14.5 07/20/2022    HCT 43.8 07/20/2022    MCV 87.6 07/20/2022    MCH 29.0 07/20/2022    MCHC 33.1 07/20/2022    RDW 13.0 07/20/2022    RDWSD 41.8 07/20/2022    MPV 9.5 07/20/2022     07/20/2022    NEUTRORELPCT 61.2 07/20/2022    LYMPHORELPCT 30.9 07/20/2022    MONORELPCT 6.1 07/20/2022    EOSRELPCT 0.8 07/20/2022    BASORELPCT 0.8 07/20/2022    AUTOIGPER 0.2 07/20/2022    NEUTROABS 4.03 07/20/2022    LYMPHSABS 2.03 07/20/2022    MONOSABS 0.40 07/20/2022    EOSABS 0.05 07/20/2022    BASOSABS 0.05 07/20/2022    AUTOIGNUM 0.01 07/20/2022    NRBC 0.0 07/20/2022       TSH  Lab Results   Component Value Date    TSH 0.762 07/20/2022    TSH 1.102 10/05/2018    TSH 2.176 06/15/2016       T4  Lab Results   Component Value Date    FREET4 1.35 07/20/2022     No results found for: S6MNRHK    T3  Lab Results   Component Value Date    T3FREE 3.65 07/20/2022     No results found for: H6RVWHK    TRAb  No results found for: TSURCPAB    TPO  No results found for: THYROIDAB    No valid procedures specified.    Assessment and Plan    Diagnoses and all orders for this visit:    1. Abnormal thyroid function test (Primary)  Assessment & Plan:  Patient has a pattern of abnormal TFT's, with mild regulation in those.  With her symptoms it is likely that she may some underlying thyroid issues.  Will obtain labs today to determine.  Will follow as indicated.  Follow-up in 2 months.    Orders:  -     Thyroid Antibodies  -     Thyroid Stimulating  Immunoglobulin  -     Thyrotropin Receptor Antibody  -     TSH  -     T4, Free  -     T3, Free       Return in about 2 months (around 10/4/2022) for Follow-up appointment. The patient was instructed to contact the clinic with any interval questions or concerns.      This document has been electronically signed by LUZ Castro  August 4, 2022 15:33 EDT   Endocrinology

## 2022-08-04 NOTE — ASSESSMENT & PLAN NOTE
Patient has a pattern of abnormal TFT's, with mild regulation in those.  With her symptoms it is likely that she may some underlying thyroid issues.  Will obtain labs today to determine.  Will follow as indicated.  Follow-up in 2 months.

## 2022-08-05 LAB
T3FREE SERPL-MCNC: 3.14 PG/ML (ref 2–4.4)
T4 FREE SERPL-MCNC: 1.1 NG/DL (ref 0.93–1.7)
TSH SERPL DL<=0.05 MIU/L-ACNC: 1.24 UIU/ML (ref 0.27–4.2)

## 2022-08-08 LAB
THYROGLOB AB SERPL-ACNC: <1 IU/ML (ref 0–0.9)
THYROPEROXIDASE AB SERPL-ACNC: 12 IU/ML (ref 0–34)
TSH RECEP AB SER-ACNC: <1.1 IU/L (ref 0–1.75)
TSI SER-ACNC: <0.1 IU/L (ref 0–0.55)

## 2022-12-26 DIAGNOSIS — Z30.9 ENCOUNTER FOR CONTRACEPTIVE MANAGEMENT, UNSPECIFIED TYPE: ICD-10-CM

## 2022-12-27 DIAGNOSIS — Z30.9 ENCOUNTER FOR CONTRACEPTIVE MANAGEMENT, UNSPECIFIED TYPE: ICD-10-CM

## 2022-12-27 RX ORDER — NORGESTIMATE AND ETHINYL ESTRADIOL 0.25-0.035
1 KIT ORAL DAILY
Qty: 28 TABLET | Refills: 12 | OUTPATIENT
Start: 2022-12-27

## 2022-12-28 NOTE — TELEPHONE ENCOUNTER
Needs appt    Left a message to return call.    Left a second message to return call.    Left a third message to return call.      Letter mailed.

## 2024-02-28 ENCOUNTER — HOSPITAL ENCOUNTER (EMERGENCY)
Facility: HOSPITAL | Age: 29
Discharge: HOME | End: 2024-02-28
Attending: STUDENT IN AN ORGANIZED HEALTH CARE EDUCATION/TRAINING PROGRAM | Admitting: STUDENT IN AN ORGANIZED HEALTH CARE EDUCATION/TRAINING PROGRAM
Payer: COMMERCIAL

## 2024-02-28 ENCOUNTER — APPOINTMENT (OUTPATIENT)
Dept: CT IMAGING | Facility: HOSPITAL | Age: 29
End: 2024-02-28
Payer: COMMERCIAL

## 2024-02-28 VITALS
WEIGHT: 170 LBS | TEMPERATURE: 98.3 F | HEART RATE: 83 BPM | BODY MASS INDEX: 28.32 KG/M2 | HEIGHT: 65 IN | OXYGEN SATURATION: 100 % | DIASTOLIC BLOOD PRESSURE: 90 MMHG | RESPIRATION RATE: 14 BRPM | SYSTOLIC BLOOD PRESSURE: 146 MMHG

## 2024-02-28 DIAGNOSIS — G43.809 OTHER MIGRAINE WITHOUT STATUS MIGRAINOSUS, NOT INTRACTABLE: Primary | ICD-10-CM

## 2024-02-28 LAB
ALBUMIN SERPL-MCNC: 4.2 G/DL (ref 3.5–5.2)
ALBUMIN/GLOB SERPL: 1.4 G/DL
ALP SERPL-CCNC: 91 U/L (ref 39–117)
ALT SERPL W P-5'-P-CCNC: 10 U/L (ref 1–33)
ANION GAP SERPL CALCULATED.3IONS-SCNC: 12.5 MMOL/L (ref 5–15)
AST SERPL-CCNC: 13 U/L (ref 1–32)
BASOPHILS # BLD AUTO: 0.04 10*3/MM3 (ref 0–0.2)
BASOPHILS NFR BLD AUTO: 0.7 % (ref 0–1.5)
BILIRUB SERPL-MCNC: 0.3 MG/DL (ref 0–1.2)
BUN SERPL-MCNC: 8 MG/DL (ref 6–20)
BUN/CREAT SERPL: 13.3 (ref 7–25)
CALCIUM SPEC-SCNC: 9.5 MG/DL (ref 8.6–10.5)
CHLORIDE SERPL-SCNC: 103 MMOL/L (ref 98–107)
CO2 SERPL-SCNC: 25.5 MMOL/L (ref 22–29)
CREAT SERPL-MCNC: 0.6 MG/DL (ref 0.57–1)
CRP SERPL-MCNC: 1.02 MG/DL (ref 0–0.5)
DEPRECATED RDW RBC AUTO: 41.6 FL (ref 37–54)
EGFRCR SERPLBLD CKD-EPI 2021: 126.3 ML/MIN/1.73
EOSINOPHIL # BLD AUTO: 0.15 10*3/MM3 (ref 0–0.4)
EOSINOPHIL NFR BLD AUTO: 2.7 % (ref 0.3–6.2)
ERYTHROCYTE [DISTWIDTH] IN BLOOD BY AUTOMATED COUNT: 12.8 % (ref 12.3–15.4)
ERYTHROCYTE [SEDIMENTATION RATE] IN BLOOD: 15 MM/HR (ref 0–20)
GLOBULIN UR ELPH-MCNC: 3 GM/DL
GLUCOSE SERPL-MCNC: 97 MG/DL (ref 65–99)
HCG SERPL QL: NEGATIVE
HCT VFR BLD AUTO: 43.1 % (ref 34–46.6)
HGB BLD-MCNC: 13.7 G/DL (ref 12–15.9)
HOLD SPECIMEN: NORMAL
IMM GRANULOCYTES # BLD AUTO: 0.01 10*3/MM3 (ref 0–0.05)
IMM GRANULOCYTES NFR BLD AUTO: 0.2 % (ref 0–0.5)
LYMPHOCYTES # BLD AUTO: 1.67 10*3/MM3 (ref 0.7–3.1)
LYMPHOCYTES NFR BLD AUTO: 30.1 % (ref 19.6–45.3)
MCH RBC QN AUTO: 28.4 PG (ref 26.6–33)
MCHC RBC AUTO-ENTMCNC: 31.8 G/DL (ref 31.5–35.7)
MCV RBC AUTO: 89.4 FL (ref 79–97)
MONOCYTES # BLD AUTO: 0.33 10*3/MM3 (ref 0.1–0.9)
MONOCYTES NFR BLD AUTO: 6 % (ref 5–12)
NEUTROPHILS NFR BLD AUTO: 3.34 10*3/MM3 (ref 1.7–7)
NEUTROPHILS NFR BLD AUTO: 60.3 % (ref 42.7–76)
NRBC BLD AUTO-RTO: 0 /100 WBC (ref 0–0.2)
PLATELET # BLD AUTO: 312 10*3/MM3 (ref 140–450)
PMV BLD AUTO: 9.6 FL (ref 6–12)
POTASSIUM SERPL-SCNC: 3.8 MMOL/L (ref 3.5–5.2)
PROT SERPL-MCNC: 7.2 G/DL (ref 6–8.5)
RBC # BLD AUTO: 4.82 10*6/MM3 (ref 3.77–5.28)
SODIUM SERPL-SCNC: 141 MMOL/L (ref 136–145)
TSH SERPL DL<=0.05 MIU/L-ACNC: 1.92 UIU/ML (ref 0.27–4.2)
WBC NRBC COR # BLD AUTO: 5.54 10*3/MM3 (ref 3.4–10.8)
WHOLE BLOOD HOLD COAG: NORMAL
WHOLE BLOOD HOLD SPECIMEN: NORMAL

## 2024-02-28 PROCEDURE — 25010000002 KETOROLAC TROMETHAMINE PER 15 MG: Performed by: PHYSICIAN ASSISTANT

## 2024-02-28 PROCEDURE — 96375 TX/PRO/DX INJ NEW DRUG ADDON: CPT

## 2024-02-28 PROCEDURE — 85652 RBC SED RATE AUTOMATED: CPT | Performed by: PHYSICIAN ASSISTANT

## 2024-02-28 PROCEDURE — 70450 CT HEAD/BRAIN W/O DYE: CPT | Performed by: RADIOLOGY

## 2024-02-28 PROCEDURE — 80050 GENERAL HEALTH PANEL: CPT | Performed by: PHYSICIAN ASSISTANT

## 2024-02-28 PROCEDURE — 99284 EMERGENCY DEPT VISIT MOD MDM: CPT

## 2024-02-28 PROCEDURE — 25810000003 SODIUM CHLORIDE 0.9 % SOLUTION: Performed by: PHYSICIAN ASSISTANT

## 2024-02-28 PROCEDURE — 70450 CT HEAD/BRAIN W/O DYE: CPT

## 2024-02-28 PROCEDURE — 25010000002 PROCHLORPERAZINE 10 MG/2ML SOLUTION: Performed by: PHYSICIAN ASSISTANT

## 2024-02-28 PROCEDURE — 96374 THER/PROPH/DIAG INJ IV PUSH: CPT

## 2024-02-28 PROCEDURE — 84703 CHORIONIC GONADOTROPIN ASSAY: CPT | Performed by: PHYSICIAN ASSISTANT

## 2024-02-28 PROCEDURE — 86140 C-REACTIVE PROTEIN: CPT | Performed by: PHYSICIAN ASSISTANT

## 2024-02-28 RX ORDER — PROCHLORPERAZINE MALEATE 10 MG
10 TABLET ORAL EVERY 6 HOURS PRN
Qty: 15 TABLET | Refills: 0 | Status: SHIPPED | OUTPATIENT
Start: 2024-02-28

## 2024-02-28 RX ORDER — PROCHLORPERAZINE EDISYLATE 5 MG/ML
10 INJECTION INTRAMUSCULAR; INTRAVENOUS ONCE
Status: COMPLETED | OUTPATIENT
Start: 2024-02-28 | End: 2024-02-28

## 2024-02-28 RX ORDER — SODIUM CHLORIDE 0.9 % (FLUSH) 0.9 %
10 SYRINGE (ML) INJECTION AS NEEDED
Status: DISCONTINUED | OUTPATIENT
Start: 2024-02-28 | End: 2024-02-28 | Stop reason: HOSPADM

## 2024-02-28 RX ORDER — KETOROLAC TROMETHAMINE 10 MG/1
10 TABLET, FILM COATED ORAL EVERY 6 HOURS PRN
Qty: 15 TABLET | Refills: 0 | Status: SHIPPED | OUTPATIENT
Start: 2024-02-28

## 2024-02-28 RX ORDER — KETOROLAC TROMETHAMINE 30 MG/ML
15 INJECTION, SOLUTION INTRAMUSCULAR; INTRAVENOUS ONCE
Status: COMPLETED | OUTPATIENT
Start: 2024-02-28 | End: 2024-02-28

## 2024-02-28 RX ORDER — SUMATRIPTAN 6 MG/.5ML
6 INJECTION, SOLUTION SUBCUTANEOUS ONCE
Status: DISCONTINUED | OUTPATIENT
Start: 2024-02-28 | End: 2024-02-28

## 2024-02-28 RX ADMIN — PROCHLORPERAZINE EDISYLATE 10 MG: 5 INJECTION INTRAMUSCULAR; INTRAVENOUS at 09:04

## 2024-02-28 RX ADMIN — SODIUM CHLORIDE 1000 ML: 9 INJECTION, SOLUTION INTRAVENOUS at 09:04

## 2024-02-28 RX ADMIN — KETOROLAC TROMETHAMINE 15 MG: 30 INJECTION, SOLUTION INTRAMUSCULAR at 09:04

## 2024-02-28 NOTE — ED PROVIDER NOTES
Subjective   History of Present Illness  28-year-old female presents secondary to headache.  Patient states that she has a headache every day of her life however this headache started on Thursday.  She was seen at urgent care she received a Imitrex shot which improved this until Sunday.  This came back and she was subsequently given Toradol.  This helped for a while and her symptoms have once again returned.  Patient reports this is a throbbing headache on both sides of her head.  She states that she has had nausea vomiting intermittent.  She denies any fever.  She states that pain shoots into her right neck at times.  She has normal range of motion in her neck without meningeal signs.  She states that her headaches that she has daily or more tension-like and typically resolve with Tylenol.  She states that she is never had any imaging of her head.  She has a past medical history of tachycardia status post COVID along with hypertension, thyroid disease and anxiety.  She states that her only recent medication change was Wellbutrin.        Review of Systems   Constitutional: Negative.  Negative for fever.   Respiratory: Negative.     Cardiovascular: Negative.  Negative for chest pain.   Gastrointestinal:  Positive for nausea and vomiting. Negative for abdominal pain.   Endocrine: Negative.    Genitourinary: Negative.  Negative for dysuria.   Skin: Negative.    Neurological:  Positive for headaches.   Psychiatric/Behavioral: Negative.     All other systems reviewed and are negative.      Past Medical History:   Diagnosis Date    Anxiety     Hyperthyroidism        No Known Allergies    Past Surgical History:   Procedure Laterality Date    ADENOIDECTOMY      TONSILLECTOMY         Family History   Problem Relation Age of Onset    Diabetes Mother     No Known Problems Father     Diabetes Maternal Grandfather     Cancer Maternal Grandmother     Cancer Paternal Grandmother        Social History     Socioeconomic History     Marital status: Single   Tobacco Use    Smoking status: Never    Smokeless tobacco: Never   Substance and Sexual Activity    Alcohol use: No    Drug use: No    Sexual activity: Defer           Objective   Physical Exam  Vitals and nursing note reviewed.   Constitutional:       General: She is not in acute distress.     Appearance: She is well-developed. She is not diaphoretic.   HENT:      Head: Normocephalic and atraumatic.      Right Ear: External ear normal.      Left Ear: External ear normal.      Nose: Nose normal.   Eyes:      Conjunctiva/sclera: Conjunctivae normal.      Pupils: Pupils are equal, round, and reactive to light.   Neck:      Vascular: No JVD.      Trachea: No tracheal deviation.   Cardiovascular:      Rate and Rhythm: Normal rate and regular rhythm.      Heart sounds: Normal heart sounds. No murmur heard.  Pulmonary:      Effort: Pulmonary effort is normal. No respiratory distress.      Breath sounds: Normal breath sounds. No wheezing.   Abdominal:      General: Bowel sounds are normal.      Palpations: Abdomen is soft.      Tenderness: There is no abdominal tenderness.   Musculoskeletal:         General: No deformity. Normal range of motion.      Cervical back: Normal range of motion and neck supple.   Skin:     General: Skin is warm and dry.      Coloration: Skin is not pale.      Findings: No erythema or rash.   Neurological:      Mental Status: She is alert and oriented to person, place, and time.      Cranial Nerves: No cranial nerve deficit.   Psychiatric:         Behavior: Behavior normal.         Thought Content: Thought content normal.         Procedures           ED Course  ED Course as of 02/28/24 1601   Wed Feb 28, 2024   0918 Compazine has made patient still somewhat anxious and restless.  She declines any other medicine at this time. [JI]      ED Course User Index  [JI] Curt Zaldivar PA                                 Results for orders placed or performed during the hospital  encounter of 02/28/24   Comprehensive Metabolic Panel    Specimen: Blood   Result Value Ref Range    Glucose 97 65 - 99 mg/dL    BUN 8 6 - 20 mg/dL    Creatinine 0.60 0.57 - 1.00 mg/dL    Sodium 141 136 - 145 mmol/L    Potassium 3.8 3.5 - 5.2 mmol/L    Chloride 103 98 - 107 mmol/L    CO2 25.5 22.0 - 29.0 mmol/L    Calcium 9.5 8.6 - 10.5 mg/dL    Total Protein 7.2 6.0 - 8.5 g/dL    Albumin 4.2 3.5 - 5.2 g/dL    ALT (SGPT) 10 1 - 33 U/L    AST (SGOT) 13 1 - 32 U/L    Alkaline Phosphatase 91 39 - 117 U/L    Total Bilirubin 0.3 0.0 - 1.2 mg/dL    Globulin 3.0 gm/dL    A/G Ratio 1.4 g/dL    BUN/Creatinine Ratio 13.3 7.0 - 25.0    Anion Gap 12.5 5.0 - 15.0 mmol/L    eGFR 126.3 >60.0 mL/min/1.73   hCG, Serum, Qualitative    Specimen: Blood   Result Value Ref Range    HCG Qualitative Negative Negative   C-reactive Protein    Specimen: Blood   Result Value Ref Range    C-Reactive Protein 1.02 (H) 0.00 - 0.50 mg/dL   Sedimentation Rate    Specimen: Blood   Result Value Ref Range    Sed Rate 15 0 - 20 mm/hr   CBC Auto Differential    Specimen: Blood   Result Value Ref Range    WBC 5.54 3.40 - 10.80 10*3/mm3    RBC 4.82 3.77 - 5.28 10*6/mm3    Hemoglobin 13.7 12.0 - 15.9 g/dL    Hematocrit 43.1 34.0 - 46.6 %    MCV 89.4 79.0 - 97.0 fL    MCH 28.4 26.6 - 33.0 pg    MCHC 31.8 31.5 - 35.7 g/dL    RDW 12.8 12.3 - 15.4 %    RDW-SD 41.6 37.0 - 54.0 fl    MPV 9.6 6.0 - 12.0 fL    Platelets 312 140 - 450 10*3/mm3    Neutrophil % 60.3 42.7 - 76.0 %    Lymphocyte % 30.1 19.6 - 45.3 %    Monocyte % 6.0 5.0 - 12.0 %    Eosinophil % 2.7 0.3 - 6.2 %    Basophil % 0.7 0.0 - 1.5 %    Immature Grans % 0.2 0.0 - 0.5 %    Neutrophils, Absolute 3.34 1.70 - 7.00 10*3/mm3    Lymphocytes, Absolute 1.67 0.70 - 3.10 10*3/mm3    Monocytes, Absolute 0.33 0.10 - 0.90 10*3/mm3    Eosinophils, Absolute 0.15 0.00 - 0.40 10*3/mm3    Basophils, Absolute 0.04 0.00 - 0.20 10*3/mm3    Immature Grans, Absolute 0.01 0.00 - 0.05 10*3/mm3    nRBC 0.0 0.0 - 0.2 /100  WBC   TSH    Specimen: Blood   Result Value Ref Range    TSH 1.920 0.270 - 4.200 uIU/mL   Green Top (Gel)   Result Value Ref Range    Extra Tube Hold for add-ons.    Lavender Top   Result Value Ref Range    Extra Tube hold for add-on    Light Blue Top   Result Value Ref Range    Extra Tube Hold for add-ons.      CT Head Without Contrast    Result Date: 2/28/2024    Unremarkable exam demonstrating no CT evidence of acute intracranial findings.   This report was finalized on 2/28/2024 10:36 AM by Dr. Joshua Singh MD.                 Medical Decision Making  28-year-old female presents secondary to headache.  Patient states that she has a headache every day of her life however this headache started on Thursday.  She was seen at urgent care she received a Imitrex shot which improved this until Sunday.  This came back and she was subsequently given Toradol.  This helped for a while and her symptoms have once again returned.  Patient reports this is a throbbing headache on both sides of her head.  She states that she has had nausea vomiting intermittent.  She denies any fever.  She states that pain shoots into her right neck at times.  She has normal range of motion in her neck without meningeal signs.  She states that her headaches that she has daily or more tension-like and typically resolve with Tylenol.  She states that she is never had any imaging of her head.  She has a past medical history of tachycardia status post COVID along with hypertension, thyroid disease and anxiety.  She states that her only recent medication change was Wellbutrin.    Problems Addressed:  Other migraine without status migrainosus, not intractable: complicated acute illness or injury    Amount and/or Complexity of Data Reviewed  Labs: ordered. Decision-making details documented in ED Course.  Radiology: ordered. Decision-making details documented in ED Course.    Risk  Prescription drug management.  Risk Details: Patient was counseled  better diagnostic order labs.  She was counseled on the signs and symptoms worsening appropriate follow-up.  She voices understanding.  She states that she has intranasal Imitrex at home.        Final diagnoses:   Other migraine without status migrainosus, not intractable       ED Disposition  ED Disposition       ED Disposition   Discharge    Condition   Stable    Comment   --               Hamilton, April, APRN  [address] [place] [address]  [phone]    Schedule an appointment as soon as possible for a visit       [facility] EMERGENCY DEPARTMENT  [address] [place] [id number] [phone]    If symptoms worsen         Medication List        New Prescriptions      ketorolac 10 MG tablet  Commonly known as: TORADOL  Take 1 tablet by mouth Every 6 (Six) Hours As Needed for Moderate Pain.     prochlorperazine 10 MG tablet  Commonly known as: COMPAZINE  Take 1 tablet by mouth Every 6 (Six) Hours As Needed for Nausea or Vomiting.            Stop      hydrOXYzine pamoate 50 MG capsule  Commonly known as: VISTARIL     venlafaxine 75 MG tablet  Commonly known as: EFFEXOR               Where to Get Your Medications        You can get these medications from any pharmacy    Bring a paper prescription for each of these medications  ketorolac 10 MG tablet  prochlorperazine 10 MG tablet            Curt Zaldivar PA  02/28/24 3751

## 2025-03-05 ENCOUNTER — HOSPITAL ENCOUNTER (OUTPATIENT)
Dept: ULTRASOUND IMAGING | Facility: HOSPITAL | Age: 30
Discharge: HOME | End: 2025-03-05
Admitting: NURSE PRACTITIONER
Payer: COMMERCIAL

## 2025-03-05 DIAGNOSIS — N63.0 LUMP IN FEMALE BREAST: ICD-10-CM

## 2025-03-05 PROCEDURE — 76642 ULTRASOUND BREAST LIMITED: CPT

## 2025-03-10 ENCOUNTER — OFFICE VISIT (OUTPATIENT)
Dept: SURGERY | Facility: CLINIC | Age: 30
End: 2025-03-10
Payer: COMMERCIAL

## 2025-03-10 VITALS
SYSTOLIC BLOOD PRESSURE: 126 MMHG | DIASTOLIC BLOOD PRESSURE: 82 MMHG | HEART RATE: 72 BPM | WEIGHT: 177.6 LBS | BODY MASS INDEX: 29.59 KG/M2 | HEIGHT: 65 IN

## 2025-03-10 DIAGNOSIS — L02.213 CUTANEOUS ABSCESS OF CHEST WALL: Primary | ICD-10-CM

## 2025-03-10 RX ORDER — ESCITALOPRAM OXALATE 20 MG/1
20 TABLET ORAL DAILY
COMMUNITY
Start: 2024-10-15

## 2025-03-10 NOTE — PROGRESS NOTES
"Subjective   Dedra Kellogg is a 29 y.o. female here today for abscess of left breast.    History of Present Illness  Ms. Kellogg was seen in the office today for an infected sebaceous cyst at the inframammary fold of the left breast.  She has completed 5 days of Keflex.  Ultrasound of the left breast was performed on 3/5/2025 which demonstrated a superficial abscess in the skin.  No Known Allergies  Current Outpatient Medications   Medication Sig Dispense Refill    escitalopram (LEXAPRO) 20 MG tablet Take 1 tablet by mouth Daily.      metoprolol succinate XL (TOPROL-XL) 25 MG 24 hr tablet Take 1 tablet by mouth Daily.      norgestimate-ethinyl estradiol (Sprintec 28) 0.25-35 MG-MCG per tablet Take 1 tablet by mouth Daily. 28 tablet 12    ketorolac (TORADOL) 10 MG tablet Take 1 tablet by mouth Every 6 (Six) Hours As Needed for Moderate Pain. (Patient not taking: Reported on 3/10/2025) 15 tablet 0     No current facility-administered medications for this visit.     Past Medical History:   Diagnosis Date    Anxiety     Hyperthyroidism      Past Surgical History:   Procedure Laterality Date    ADENOIDECTOMY      TONSILLECTOMY         Pertinent Review of Systems:  Respiratory: no shortness of breath  Cardiovascular: no chest pain  Other pertinent:      Objective   /82 (BP Location: Left arm)   Pulse 72   Ht 165.1 cm (65\")   Wt 80.6 kg (177 lb 9.6 oz)   BMI 29.55 kg/m²   Physical Exam  Left breast: Infected sebaceous cyst at the inframammary fold in the 7 o'clock position..    Procedures   Procedure Note    Procedure: Incision and drainage infected sebaceous cyst    Location: Left chest wall at the inframammary fold    Anesthesia: 1% lidocaine with epinephrine    Description:  The risks and benefits of the procedure were discussed.  After prep with Betadine and infiltration with lidocaine a small incision was made at the fluctuant area and carried down into the tissue.  Pus and some cyst contents were evacuated.  " Dressing was placed.    Complications:  none    Follow-up: Patient to follow-up in 1 month for definitive excision    Results/Data:  Imaging: Breast ultrasound reports and images from 3/5/2025 were reviewed.  I agree with the assessment      Assessment & Plan   Status post incision and drainage of an infected sebaceous cyst at the left inframammary fold    Patient to return in 1 month for definitive excision         Discussion/Summary    Time spent:     BMI is >= 25 and <30. (Overweight) The following options were offered after discussion;: nutrition counseling/recommendations       No future appointments.      This document has been electronically signed by       March 10, 2025 14:51 EDT      Please note that portions of this note were completed with a voice recognition program.

## 2025-04-07 ENCOUNTER — OFFICE VISIT (OUTPATIENT)
Dept: SURGERY | Facility: CLINIC | Age: 30
End: 2025-04-07
Payer: COMMERCIAL

## 2025-04-07 VITALS
HEART RATE: 62 BPM | WEIGHT: 178 LBS | BODY MASS INDEX: 29.66 KG/M2 | SYSTOLIC BLOOD PRESSURE: 108 MMHG | HEIGHT: 65 IN | DIASTOLIC BLOOD PRESSURE: 72 MMHG

## 2025-04-07 DIAGNOSIS — L72.3 SEBACEOUS CYST: Primary | ICD-10-CM

## 2025-04-07 PROCEDURE — 12031 INTMD RPR S/A/T/EXT 2.5 CM/<: CPT | Performed by: SURGERY

## 2025-04-07 PROCEDURE — 11402 EXC TR-EXT B9+MARG 1.1-2 CM: CPT | Performed by: SURGERY

## 2025-04-07 NOTE — PROGRESS NOTES
"Subjective   Dedra Kellogg is a 29 y.o. female here today for follow up.    History of Present Illness  Ms. Kellogg was seen in the office today for excision of a recently infected sebaceous cyst of the left inframammary fold.  Patient underwent incision and drainage on 3/10/2025  No Known Allergies    Current Outpatient Medications   Medication Sig Dispense Refill    escitalopram (LEXAPRO) 20 MG tablet Take 1 tablet by mouth Daily.      ketorolac (TORADOL) 10 MG tablet Take 1 tablet by mouth Every 6 (Six) Hours As Needed for Moderate Pain. 15 tablet 0    metoprolol succinate XL (TOPROL-XL) 25 MG 24 hr tablet Take 1 tablet by mouth Daily.      norgestimate-ethinyl estradiol (Sprintec 28) 0.25-35 MG-MCG per tablet Take 1 tablet by mouth Daily. 28 tablet 12     No current facility-administered medications for this visit.     Past Medical History:   Diagnosis Date    Anxiety     Hyperthyroidism      Past Surgical History:   Procedure Laterality Date    ADENOIDECTOMY      TONSILLECTOMY         Pertinent Review of Systems    Objective   /72 (BP Location: Left arm)   Ht 165.1 cm (65\")   Wt 80.7 kg (178 lb)   BMI 29.62 kg/m²    Physical Exam  Left inframammary fold: Recent surgical scar with obvious residual cyst or cellulitis    Procedures   Procedure Note    Procedure: Excision recently infected sebaceous cyst    Location: Left inframammary fold    Anesthesia: 1% lidocaine with epinephrine    Description:  The risks and benefits of the procedure were discussed.  After prep with Betadine and infiltration with lidocaine an elliptical incision was made around the cyst and carried down to the subcu.  There was a moderate amount of chronic granulation tissue.  Cyst and cavity were excised and closed in a 2 layer closure of interrupted 3-0 Vicryl subdermal sutures, followed by 4-0 Monocryl subcuticular stitch.  Incision length was 1.6 cm    Complications:  none    Follow-up: As needed  Results/Data:      Assessment & " Plan   Status post excision of recently infected sebaceous cyst    Follow-up as needed       Discussion/Summary:    Time spent:     BMI is >= 25 and <30. (Overweight) The following options were offered after discussion;: nutrition counseling/recommendations       @[email or web address]    This document has been electronically signed by        April 7, 2025 16:51 EDT    Please note that portions of this note were completed with a voice recognition program.